# Patient Record
Sex: FEMALE | Race: WHITE | NOT HISPANIC OR LATINO | Employment: OTHER | URBAN - METROPOLITAN AREA
[De-identification: names, ages, dates, MRNs, and addresses within clinical notes are randomized per-mention and may not be internally consistent; named-entity substitution may affect disease eponyms.]

---

## 2024-01-23 ENCOUNTER — HOSPITAL ENCOUNTER (INPATIENT)
Facility: HOSPITAL | Age: 64
LOS: 3 days | Discharge: HOME | End: 2024-01-26
Attending: EMERGENCY MEDICINE | Admitting: INTERNAL MEDICINE
Payer: COMMERCIAL

## 2024-01-23 ENCOUNTER — APPOINTMENT (OUTPATIENT)
Dept: CARDIOLOGY | Facility: HOSPITAL | Age: 64
End: 2024-01-23
Payer: COMMERCIAL

## 2024-01-23 ENCOUNTER — APPOINTMENT (OUTPATIENT)
Dept: RADIOLOGY | Facility: HOSPITAL | Age: 64
End: 2024-01-23
Payer: COMMERCIAL

## 2024-01-23 DIAGNOSIS — R65.21 SEPTIC SHOCK (MULTI): Primary | ICD-10-CM

## 2024-01-23 DIAGNOSIS — A41.9 SEPTIC SHOCK (MULTI): Primary | ICD-10-CM

## 2024-01-23 PROBLEM — G93.41 ACUTE METABOLIC ENCEPHALOPATHY: Status: ACTIVE | Noted: 2024-01-23

## 2024-01-23 PROBLEM — C34.80 MALIGNANT NEOPLASM OF OVERLAPPING SITES OF LUNG (MULTI): Status: ACTIVE | Noted: 2024-01-23

## 2024-01-23 PROBLEM — D84.9 IMMUNOCOMPROMISED STATE (MULTI): Status: ACTIVE | Noted: 2024-01-23

## 2024-01-23 LAB
ALBUMIN SERPL BCP-MCNC: 3.6 G/DL (ref 3.4–5)
ALP SERPL-CCNC: 62 U/L (ref 33–136)
ALT SERPL W P-5'-P-CCNC: 12 U/L (ref 7–45)
ANION GAP BLDV CALCULATED.4IONS-SCNC: 13 MMOL/L (ref 10–25)
ANION GAP SERPL CALC-SCNC: 17 MMOL/L (ref 10–20)
APPEARANCE UR: ABNORMAL
AST SERPL W P-5'-P-CCNC: 21 U/L (ref 9–39)
BASE EXCESS BLDV CALC-SCNC: 2.6 MMOL/L (ref -2–3)
BASOPHILS # BLD MANUAL: 0.03 X10*3/UL (ref 0–0.1)
BASOPHILS NFR BLD MANUAL: 2 %
BILIRUB SERPL-MCNC: 0.6 MG/DL (ref 0–1.2)
BILIRUB UR STRIP.AUTO-MCNC: NEGATIVE MG/DL
BODY TEMPERATURE: 37 DEGREES CELSIUS
BUN SERPL-MCNC: 16 MG/DL (ref 6–23)
CA-I BLDV-SCNC: 1.09 MMOL/L (ref 1.1–1.33)
CALCIUM SERPL-MCNC: 8.9 MG/DL (ref 8.6–10.3)
CHLORIDE BLDV-SCNC: 100 MMOL/L (ref 98–107)
CHLORIDE SERPL-SCNC: 98 MMOL/L (ref 98–107)
CO2 SERPL-SCNC: 25 MMOL/L (ref 21–32)
COLOR UR: YELLOW
CREAT SERPL-MCNC: 0.96 MG/DL (ref 0.5–1.05)
EGFRCR SERPLBLD CKD-EPI 2021: 67 ML/MIN/1.73M*2
EOSINOPHIL # BLD MANUAL: 0 X10*3/UL (ref 0–0.7)
EOSINOPHIL NFR BLD MANUAL: 0 %
ERYTHROCYTE [DISTWIDTH] IN BLOOD BY AUTOMATED COUNT: 18.6 % (ref 11.5–14.5)
FLUAV RNA RESP QL NAA+PROBE: NOT DETECTED
FLUBV RNA RESP QL NAA+PROBE: NOT DETECTED
GLUCOSE BLDV-MCNC: 121 MG/DL (ref 74–99)
GLUCOSE SERPL-MCNC: 119 MG/DL (ref 74–99)
GLUCOSE UR STRIP.AUTO-MCNC: NEGATIVE MG/DL
HCO3 BLDV-SCNC: 26.1 MMOL/L (ref 22–26)
HCT VFR BLD AUTO: 27.9 % (ref 36–46)
HCT VFR BLD EST: 29 % (ref 36–46)
HGB BLD-MCNC: 9.1 G/DL (ref 12–16)
HGB BLDV-MCNC: 9.6 G/DL (ref 12–16)
IMM GRANULOCYTES # BLD AUTO: 0.03 X10*3/UL (ref 0–0.7)
IMM GRANULOCYTES NFR BLD AUTO: 1.8 % (ref 0–0.9)
INHALED O2 CONCENTRATION: 21 %
KETONES UR STRIP.AUTO-MCNC: NEGATIVE MG/DL
LACTATE BLDV-SCNC: 2.5 MMOL/L (ref 0.4–2)
LACTATE BLDV-SCNC: 3.9 MMOL/L (ref 0.4–2)
LACTATE BLDV-SCNC: 4.4 MMOL/L (ref 0.4–2)
LEUKOCYTE ESTERASE UR QL STRIP.AUTO: NEGATIVE
LYMPHOCYTES # BLD MANUAL: 0.58 X10*3/UL (ref 1.2–4.8)
LYMPHOCYTES NFR BLD MANUAL: 36 %
MCH RBC QN AUTO: 34.2 PG (ref 26–34)
MCHC RBC AUTO-ENTMCNC: 32.6 G/DL (ref 32–36)
MCV RBC AUTO: 105 FL (ref 80–100)
METAMYELOCYTES # BLD MANUAL: 0.03 X10*3/UL
METAMYELOCYTES NFR BLD MANUAL: 2 %
MONOCYTES # BLD MANUAL: 0.54 X10*3/UL (ref 0.1–1)
MONOCYTES NFR BLD MANUAL: 34 %
MUCOUS THREADS #/AREA URNS AUTO: ABNORMAL /LPF
NEUTROPHILS # BLD MANUAL: 0.42 X10*3/UL (ref 1.2–7.7)
NEUTS BAND # BLD MANUAL: 0.26 X10*3/UL (ref 0–0.7)
NEUTS BAND NFR BLD MANUAL: 16 %
NEUTS SEG # BLD MANUAL: 0.16 X10*3/UL (ref 1.2–7)
NEUTS SEG NFR BLD MANUAL: 10 %
NITRITE UR QL STRIP.AUTO: NEGATIVE
NRBC BLD-RTO: 3.7 /100 WBCS (ref 0–0)
OXYHGB MFR BLDV: 67.7 % (ref 45–75)
PCO2 BLDV: 35 MM HG (ref 41–51)
PH BLDV: 7.48 PH (ref 7.33–7.43)
PH UR STRIP.AUTO: 6 [PH]
PLATELET # BLD AUTO: 159 X10*3/UL (ref 150–450)
PO2 BLDV: 41 MM HG (ref 35–45)
POTASSIUM BLDV-SCNC: 2.9 MMOL/L (ref 3.5–5.3)
POTASSIUM SERPL-SCNC: 3 MMOL/L (ref 3.5–5.3)
PROT SERPL-MCNC: 6.5 G/DL (ref 6.4–8.2)
PROT UR STRIP.AUTO-MCNC: NEGATIVE MG/DL
RBC # BLD AUTO: 2.66 X10*6/UL (ref 4–5.2)
RBC # UR STRIP.AUTO: ABNORMAL /UL
RBC #/AREA URNS AUTO: ABNORMAL /HPF
RBC MORPH BLD: ABNORMAL
RSV RNA RESP QL NAA+PROBE: NOT DETECTED
SAO2 % BLDV: 69 % (ref 45–75)
SARS-COV-2 RNA RESP QL NAA+PROBE: NOT DETECTED
SODIUM BLDV-SCNC: 136 MMOL/L (ref 136–145)
SODIUM SERPL-SCNC: 137 MMOL/L (ref 136–145)
SP GR UR STRIP.AUTO: 1.02
SQUAMOUS #/AREA URNS AUTO: ABNORMAL /HPF
TOTAL CELLS COUNTED BLD: 100
UROBILINOGEN UR STRIP.AUTO-MCNC: <2 MG/DL
WBC # BLD AUTO: 1.6 X10*3/UL (ref 4.4–11.3)
WBC #/AREA URNS AUTO: ABNORMAL /HPF

## 2024-01-23 PROCEDURE — 84075 ASSAY ALKALINE PHOSPHATASE: CPT | Performed by: EMERGENCY MEDICINE

## 2024-01-23 PROCEDURE — 99285 EMERGENCY DEPT VISIT HI MDM: CPT | Performed by: EMERGENCY MEDICINE

## 2024-01-23 PROCEDURE — 71045 X-RAY EXAM CHEST 1 VIEW: CPT

## 2024-01-23 PROCEDURE — 96374 THER/PROPH/DIAG INJ IV PUSH: CPT | Mod: 59

## 2024-01-23 PROCEDURE — 96367 TX/PROPH/DG ADDL SEQ IV INF: CPT

## 2024-01-23 PROCEDURE — 87040 BLOOD CULTURE FOR BACTERIA: CPT | Mod: AHULAB | Performed by: EMERGENCY MEDICINE

## 2024-01-23 PROCEDURE — 36415 COLL VENOUS BLD VENIPUNCTURE: CPT | Performed by: EMERGENCY MEDICINE

## 2024-01-23 PROCEDURE — 96366 THER/PROPH/DIAG IV INF ADDON: CPT

## 2024-01-23 PROCEDURE — 2020000001 HC ICU ROOM DAILY

## 2024-01-23 PROCEDURE — 93005 ELECTROCARDIOGRAM TRACING: CPT

## 2024-01-23 PROCEDURE — 96376 TX/PRO/DX INJ SAME DRUG ADON: CPT

## 2024-01-23 PROCEDURE — 96375 TX/PRO/DX INJ NEW DRUG ADDON: CPT

## 2024-01-23 PROCEDURE — 81001 URINALYSIS AUTO W/SCOPE: CPT | Performed by: EMERGENCY MEDICINE

## 2024-01-23 PROCEDURE — 96361 HYDRATE IV INFUSION ADD-ON: CPT

## 2024-01-23 PROCEDURE — 85027 COMPLETE CBC AUTOMATED: CPT | Performed by: EMERGENCY MEDICINE

## 2024-01-23 PROCEDURE — 87637 SARSCOV2&INF A&B&RSV AMP PRB: CPT | Performed by: EMERGENCY MEDICINE

## 2024-01-23 PROCEDURE — 84132 ASSAY OF SERUM POTASSIUM: CPT | Performed by: EMERGENCY MEDICINE

## 2024-01-23 PROCEDURE — 71045 X-RAY EXAM CHEST 1 VIEW: CPT | Performed by: RADIOLOGY

## 2024-01-23 PROCEDURE — 85007 BL SMEAR W/DIFF WBC COUNT: CPT | Performed by: EMERGENCY MEDICINE

## 2024-01-23 PROCEDURE — A4217 STERILE WATER/SALINE, 500 ML: HCPCS | Performed by: EMERGENCY MEDICINE

## 2024-01-23 PROCEDURE — 96365 THER/PROPH/DIAG IV INF INIT: CPT

## 2024-01-23 PROCEDURE — 99291 CRITICAL CARE FIRST HOUR: CPT | Performed by: INTERNAL MEDICINE

## 2024-01-23 PROCEDURE — 2500000004 HC RX 250 GENERAL PHARMACY W/ HCPCS (ALT 636 FOR OP/ED): Performed by: EMERGENCY MEDICINE

## 2024-01-23 PROCEDURE — 83605 ASSAY OF LACTIC ACID: CPT | Performed by: EMERGENCY MEDICINE

## 2024-01-23 PROCEDURE — 71250 CT THORAX DX C-: CPT | Performed by: RADIOLOGY

## 2024-01-23 PROCEDURE — 2500000001 HC RX 250 WO HCPCS SELF ADMINISTERED DRUGS (ALT 637 FOR MEDICARE OP): Performed by: EMERGENCY MEDICINE

## 2024-01-23 PROCEDURE — 99291 CRITICAL CARE FIRST HOUR: CPT | Performed by: EMERGENCY MEDICINE

## 2024-01-23 PROCEDURE — 2500000005 HC RX 250 GENERAL PHARMACY W/O HCPCS

## 2024-01-23 PROCEDURE — 74176 CT ABD & PELVIS W/O CONTRAST: CPT | Performed by: RADIOLOGY

## 2024-01-23 PROCEDURE — 74176 CT ABD & PELVIS W/O CONTRAST: CPT

## 2024-01-23 RX ORDER — GABAPENTIN 100 MG/1
100 CAPSULE ORAL EVERY 8 HOURS SCHEDULED
Status: DISCONTINUED | OUTPATIENT
Start: 2024-01-23 | End: 2024-01-24

## 2024-01-23 RX ORDER — ACETAMINOPHEN 325 MG/1
975 TABLET ORAL ONCE
Status: DISCONTINUED | OUTPATIENT
Start: 2024-01-23 | End: 2024-01-25

## 2024-01-23 RX ORDER — ENOXAPARIN SODIUM 100 MG/ML
40 INJECTION SUBCUTANEOUS EVERY 24 HOURS
Status: DISCONTINUED | OUTPATIENT
Start: 2024-01-24 | End: 2024-01-26 | Stop reason: HOSPADM

## 2024-01-23 RX ORDER — LEVETIRACETAM 500 MG/1
500 TABLET ORAL 2 TIMES DAILY
Status: DISCONTINUED | OUTPATIENT
Start: 2024-01-23 | End: 2024-01-24

## 2024-01-23 RX ORDER — ACETAMINOPHEN 650 MG/1
650 SUPPOSITORY RECTAL ONCE
Status: COMPLETED | OUTPATIENT
Start: 2024-01-23 | End: 2024-01-23

## 2024-01-23 RX ORDER — NOREPINEPHRINE BITARTRATE/D5W 8 MG/250ML
.01-1 PLASTIC BAG, INJECTION (ML) INTRAVENOUS CONTINUOUS
Status: DISCONTINUED | OUTPATIENT
Start: 2024-01-23 | End: 2024-01-24

## 2024-01-23 RX ORDER — LORAZEPAM 2 MG/ML
1 INJECTION INTRAMUSCULAR NIGHTLY
Status: DISCONTINUED | OUTPATIENT
Start: 2024-01-23 | End: 2024-01-24

## 2024-01-23 RX ORDER — NOREPINEPHRINE BITARTRATE/D5W 8 MG/250ML
PLASTIC BAG, INJECTION (ML) INTRAVENOUS
Status: COMPLETED
Start: 2024-01-23 | End: 2024-01-23

## 2024-01-23 RX ORDER — POTASSIUM CHLORIDE 14.9 MG/ML
20 INJECTION INTRAVENOUS
Status: COMPLETED | OUTPATIENT
Start: 2024-01-23 | End: 2024-01-24

## 2024-01-23 RX ADMIN — PIPERACILLIN SODIUM AND TAZOBACTAM SODIUM 4.5 G: 4; .5 INJECTION, SOLUTION INTRAVENOUS at 19:12

## 2024-01-23 RX ADMIN — ACETAMINOPHEN 650 MG: 650 SUPPOSITORY RECTAL at 20:08

## 2024-01-23 RX ADMIN — SODIUM CHLORIDE 1000 ML: 9 INJECTION, SOLUTION INTRAVENOUS at 22:25

## 2024-01-23 RX ADMIN — NOREPINEPHRINE BITARTRATE 0.05 MCG/KG/MIN: 8 INJECTION, SOLUTION INTRAVENOUS at 23:01

## 2024-01-23 RX ADMIN — SODIUM CHLORIDE 2448 ML: 9 INJECTION, SOLUTION INTRAVENOUS at 19:12

## 2024-01-23 RX ADMIN — POTASSIUM CHLORIDE 20 MEQ: 14.9 INJECTION, SOLUTION INTRAVENOUS at 22:30

## 2024-01-23 RX ADMIN — POTASSIUM CHLORIDE 20 MEQ: 14.9 INJECTION, SOLUTION INTRAVENOUS at 20:06

## 2024-01-23 RX ADMIN — VANCOMYCIN HYDROCHLORIDE 2 G: 10 INJECTION, POWDER, LYOPHILIZED, FOR SOLUTION INTRAVENOUS at 20:06

## 2024-01-23 ASSESSMENT — PAIN - FUNCTIONAL ASSESSMENT: PAIN_FUNCTIONAL_ASSESSMENT: 0-10

## 2024-01-23 ASSESSMENT — PAIN SCALES - GENERAL: PAINLEVEL_OUTOF10: 0 - NO PAIN

## 2024-01-23 ASSESSMENT — COLUMBIA-SUICIDE SEVERITY RATING SCALE - C-SSRS
2. HAVE YOU ACTUALLY HAD ANY THOUGHTS OF KILLING YOURSELF?: NO
1. IN THE PAST MONTH, HAVE YOU WISHED YOU WERE DEAD OR WISHED YOU COULD GO TO SLEEP AND NOT WAKE UP?: NO
6. HAVE YOU EVER DONE ANYTHING, STARTED TO DO ANYTHING, OR PREPARED TO DO ANYTHING TO END YOUR LIFE?: NO

## 2024-01-23 NOTE — ED PROVIDER NOTES
HPI   Chief Complaint   Patient presents with    Lethargy         63-year-old female, [place], dating family.  Underlying lung cancer, stage IV metastatic, on treatment in [place].  Presents now with altered mental status, weakness confusion nausea vomiting noted today when son got home from work.  Patient really has no particular additional historical details.  Confused.    Temperature noted over 102.                          No data recorded                Patient History   Past Medical History:   Diagnosis Date    Cancer (CMS/HCC)     Personal history of other endocrine, nutritional and metabolic disease 10/30/2014    History of hypothyroidism    Personal history of pre-term labor 10/30/2014    History of premature delivery     Past Surgical History:   Procedure Laterality Date    ORIF HIP FRACTURE Left      No family history on file.  Social History     Tobacco Use    Smoking status: Former     Types: Cigarettes    Smokeless tobacco: Not on file   Substance Use Topics    Alcohol use: Not Currently    Drug use: Not Currently       Review of Systems   Unable to perform ROS: Acuity of condition        Physical Exam   ED Triage Vitals   Temp Pulse Resp BP   -- -- -- --      SpO2 Temp src Heart Rate Source Patient Position   -- -- -- --      BP Location FiO2 (%)     -- --       Physical Exam  Vitals reviewed.   Constitutional:       General: She is not in acute distress.     Appearance: She is well-developed.   HENT:      Head: Normocephalic and atraumatic.      Right Ear: External ear normal.      Left Ear: External ear normal.      Nose: Nose normal.      Mouth/Throat:      Mouth: Mucous membranes are moist.      Pharynx: Oropharynx is clear.   Eyes:      Conjunctiva/sclera: Conjunctivae normal.      Pupils: Pupils are equal, round, and reactive to light.   Neck:      Vascular: No JVD.   Cardiovascular:      Rate and Rhythm: Regular rhythm. Tachycardia present.      Pulses: Normal pulses.   Pulmonary:       Effort: Pulmonary effort is normal. No accessory muscle usage or respiratory distress.      Comments: Occasional scattered anterior lateral rhonchi but no definitive consolidation.  Abdominal:      General: Abdomen is flat. There is no distension.      Palpations: Abdomen is soft. There is no mass.      Tenderness: There is no abdominal tenderness.   Musculoskeletal:         General: Normal range of motion.      Cervical back: Normal range of motion and neck supple.   Lymphadenopathy:      Cervical: No cervical adenopathy.   Skin:     General: Skin is warm and dry.      Capillary Refill: Capillary refill takes less than 2 seconds.      Comments: No zoster rash seen    No overt other rashes.   Neurological:      General: No focal deficit present.      Mental Status: She is alert. Mental status is at baseline.   Psychiatric:         Mood and Affect: Mood normal.                 ECG if performed, ordered and interpreted by ED physician:        Labs Reviewed   CBC WITH AUTO DIFFERENTIAL - Abnormal       Result Value    WBC 1.6 (*)     nRBC 3.7 (*)     RBC 2.66 (*)     Hemoglobin 9.1 (*)     Hematocrit 27.9 (*)      (*)     MCH 34.2 (*)     MCHC 32.6      RDW 18.6 (*)     Platelets 159      Immature Granulocytes %, Automated 1.8 (*)     Immature Granulocytes Absolute, Automated 0.03      Narrative:     The previously reported component Neutrophils % is no longer being reported.  The previously reported component Lymphocytes % is no longer being reported.  The previously reported component Monocytes % is no longer being reported.  The previously   reported component Eosinophils % is no longer being reported.  The previously reported component Basophils % is no longer being reported.  The previously reported component Absolute Neutrophils is no longer being reported.  The previously reported   component Absolute Lymphocytes is no longer being reported.  The previously reported component Absolute Monocytes is no  longer being reported.  The previously reported component Absolute Eosinophils is no longer being reported.  The previously reported   component Absolute Basophils is no longer being reported.   COMPREHENSIVE METABOLIC PANEL - Abnormal    Glucose 119 (*)     Sodium 137      Potassium 3.0 (*)     Chloride 98      Bicarbonate 25      Anion Gap 17      Urea Nitrogen 16      Creatinine 0.96      eGFR 67      Calcium 8.9      Albumin 3.6      Alkaline Phosphatase 62      Total Protein 6.5      AST 21      Bilirubin, Total 0.6      ALT 12     BLOOD GAS VENOUS FULL PANEL - Abnormal    POCT pH, Venous 7.48 (*)     POCT pCO2, Venous 35 (*)     POCT pO2, Venous 41      POCT SO2, Venous 69      POCT Oxy Hemoglobin, Venous 67.7      POCT Hematocrit Calculated, Venous 29.0 (*)     POCT Sodium, Venous 136      POCT Potassium, Venous 2.9 (*)     POCT Chloride, Venous 100      POCT Ionized Calicum, Venous 1.09 (*)     POCT Glucose, Venous 121 (*)     POCT Lactate, Venous 4.4 (*)     POCT Base Excess, Venous 2.6      POCT HCO3 Calculated, Venous 26.1 (*)     POCT Hemoglobin, Venous 9.6 (*)     POCT Anion Gap, Venous 13.0      Patient Temperature 37.0      FiO2 21     URINALYSIS WITH REFLEX CULTURE AND MICROSCOPIC - Abnormal    Color, Urine Yellow      Appearance, Urine Hazy (*)     Specific Gravity, Urine 1.016      pH, Urine 6.0      Protein, Urine NEGATIVE      Glucose, Urine NEGATIVE      Blood, Urine MODERATE (2+) (*)     Ketones, Urine NEGATIVE      Bilirubin, Urine NEGATIVE      Urobilinogen, Urine <2.0      Nitrite, Urine NEGATIVE      Leukocyte Esterase, Urine NEGATIVE     BLOOD GAS LACTIC ACID, VENOUS - Abnormal    POCT Lactate, Venous 3.9 (*)    BLOOD GAS LACTIC ACID, VENOUS - Abnormal    POCT Lactate, Venous 2.5 (*)    CBC - Abnormal    WBC 3.9 (*)     nRBC 0.5 (*)     RBC 2.25 (*)     Hemoglobin 7.5 (*)     Hematocrit 24.1 (*)      (*)     MCH 33.3      MCHC 31.1 (*)     RDW 19.3 (*)     Platelets 138 (*)    RENAL  FUNCTION PANEL - Abnormal    Glucose 90      Sodium 142      Potassium 3.4 (*)     Chloride 108 (*)     Bicarbonate 23      Anion Gap 14      Urea Nitrogen 13      Creatinine 0.76      eGFR 89      Calcium 7.8 (*)     Phosphorus 3.5      Albumin 3.0 (*)    CBC - Abnormal    WBC 7.3      nRBC 0.4 (*)     RBC 2.70 (*)     Hemoglobin 9.2 (*)     Hematocrit 30.1 (*)      (*)     MCH 34.1 (*)     MCHC 30.6 (*)     RDW 18.8 (*)     Platelets 158     RENAL FUNCTION PANEL - Abnormal    Glucose 91      Sodium 140      Potassium 3.4 (*)     Chloride 106      Bicarbonate 19 (*)     Anion Gap 18      Urea Nitrogen 9      Creatinine 0.77      eGFR 87      Calcium 9.0      Phosphorus 2.3 (*)     Albumin 3.5     RENAL FUNCTION PANEL - Abnormal    Glucose 108 (*)     Sodium 139      Potassium 3.1 (*)     Chloride 104      Bicarbonate 24      Anion Gap 14      Urea Nitrogen 10      Creatinine 0.79      eGFR 85      Calcium 9.1      Phosphorus 2.1 (*)     Albumin 3.6     VANCOMYCIN, TROUGH - Abnormal    Vancomycin, Trough 26.6 (*)    CBC WITH AUTO DIFFERENTIAL - Abnormal    WBC 6.8      nRBC 0.7 (*)     RBC 2.90 (*)     Hemoglobin 9.9 (*)     Hematocrit 31.2 (*)      (*)     MCH 34.1 (*)     MCHC 31.7 (*)     RDW 18.4 (*)     Platelets 214      Neutrophils % 69.8      Immature Granulocytes %, Automated 0.7      Lymphocytes % 19.8      Monocytes % 9.0      Eosinophils % 0.4      Basophils % 0.3      Neutrophils Absolute 4.73      Immature Granulocytes Absolute, Automated 0.05      Lymphocytes Absolute 1.34      Monocytes Absolute 0.61      Eosinophils Absolute 0.03      Basophils Absolute 0.02     POCT GLUCOSE - Abnormal    POCT Glucose 111 (*)    URINALYSIS MICROSCOPIC WITH REFLEX CULTURE - Abnormal    WBC, Urine 1-5      RBC, Urine 6-10 (*)     Squamous Epithelial Cells, Urine 1-9 (SPARSE)      Mucus, Urine 1+     MANUAL DIFFERENTIAL - Abnormal    Neutrophils %, Manual 10.0      Bands %, Manual 16.0      Lymphocytes %,  Manual 36.0      Monocytes %, Manual 34.0      Eosinophils %, Manual 0.0      Basophils %, Manual 2.0      Metamyelocytes %, Manual 2.0      Seg Neutrophils Absolute, Manual 0.16 (*)     Bands Absolute, Manual 0.26      Lymphocytes Absolute, Manual 0.58 (*)     Monocytes Absolute, Manual 0.54      Eosinophils Absolute, Manual 0.00      Basophils Absolute, Manual 0.03      Metamyelocytes Absolute, Manual 0.03      Total Cells Counted 100      Neutrophils Absolute, Manual 0.42 (*)     RBC Morphology No significant RBC morphology present     BLOOD CULTURE - Normal    Blood Culture No growth at 4 days -  FINAL REPORT     BLOOD CULTURE - Normal    Blood Culture No growth at 4 days -  FINAL REPORT     SARS-COV-2 PCR, SYMPTOMATIC - Normal    Coronavirus 2019, PCR Not Detected      Narrative:     This assay has received FDA Emergency Use Authorization (EUA) and is only authorized for the duration of time that circumstances exist to justify the authorization of the emergency use of in vitro diagnostic tests for the detection of SARS-CoV-2 virus and/or diagnosis of COVID-19 infection under section 564(b)(1) of the Act, 21 U.S.C. 360bbb-3(b)(1). This assay is an in vitro diagnostic nucleic acid amplification test for the qualitative detection of SARS-CoV-2 from nasopharyngeal specimens and has been validated for use at [facility]. Negative results do not preclude COVID-19 infections and should not be used as the sole basis for diagnosis, treatment, or other management decisions.     INFLUENZA A AND B PCR - Normal    Flu A Result Not Detected      Flu B Result Not Detected      Narrative:     This assay is an in vitro diagnostic multiplex nucleic acid amplification test for the detection and discrimination of Influenza A & B from nasopharyngeal specimens, and has been validated for use at [facility]. Negative results do not preclude Influenza A/B infections, and should not be used  as the sole basis for diagnosis, treatment, or other management decisions. If Influenza A/B and RSV PCR results are negative, testing for Parainfluenza virus, Adenovirus and Metapneumovirus is routinely performed for [facility] pediatric oncology and intensive care inpatients, and is available on other patients by placing an add-on request.   RSV PCR - Normal    RSV PCR Not Detected      Narrative:     This assay is an FDA-cleared, in vitro diagnostic nucleic acid amplification test for the detection of RSV from nasopharyngeal specimens, and has been validated for use at [facility]. Negative results do not preclude RSV infections, and should not be used as the sole basis for diagnosis, treatment, or other management decisions. If Influenza A/B and RSV PCR results are negative, testing for Parainfluenza virus, Adenovirus and Metapneumovirus is routinely performed for pediatric oncology and intensive care inpatients at [facility], and is available on other patients by placing an add-on request.       VANCOMYCIN - Normal    Vancomycin 14.4      Narrative:     Vancomycin levels can be monitored according to area under the curve (AUC) or concentration (ug/mL). The preferred monitoring strategy is determined by the patient's renal function and indication for therapy.     For AUC monitoring, a random vancomycin level should be interpreted in the context of AUC rather than the concentration at a single point in time.    For concentration monitoring, a trough concentration drawn immediately prior to the next dose is preferred.       Therapeutic ranges using concentration-guided results:  Peak (all ages):                  30.0-40.0 ug/mL  Trough (all ages):                10.0-20.0 ug/mL              VITAMIN B12 - Normal    Vitamin B12 422     FOLATE - Normal    Folate, Serum 18.7      Narrative:     Low           <3.4  Borderline 3.4-5.0  Normal        >5.0    Patients receiving more than 5 mg/day of biotin may  have interference in test results. A sample should be taken no sooner than eight hours after previous dose. Contact the testing laboratory for additional information.    MAGNESIUM - Normal    Magnesium 1.90     POCT GLUCOSE - Normal    POCT Glucose 96     URINALYSIS WITH REFLEX CULTURE AND MICROSCOPIC    Narrative:     The following orders were created for panel order Urinalysis with Reflex Culture and Microscopic.  Procedure                               Abnormality         Status                     ---------                               -----------         ------                     Urinalysis with Reflex C...[[id number]]  Abnormal            Final result               Extra Urine Gray Tube[[id number]]                                                         Please view results for these tests on the individual orders.   LACTATE   POCT GLUCOSE METER   POCT GLUCOSE METER   POCT GLUCOSE METER   POCT GLUCOSE METER          CT head wo IV contrast   Final Result   No evidence of acute cortical infarct or intracranial hemorrhage.        No definite intracranial metastasis detected on noncontrast CT if   persistent concern for intracranial metastasis, contrast-enhanced MRI   is advised.        Pansinusitis.        MACRO:   None        Signed by: Shimon Kebede 1/24/2024 1:14 AM   Dictation workstation:   [id number]      CT chest abdomen pelvis wo IV contrast   Final Result   Pulmonary emphysematous and 3.4 cm x 1.9 cm masslike opacity in the   medial right upper lobe highly concerning for malignancy; clinical   correlation is recommended and comparison with available outside   prior imaging and short-term progress imaging for further evaluation.        Small ground-glass nodular opacities in the right middle lobe and   also minimal nodular opacities in the right lower lobe which may be   infectious/inflammatory in etiology, however malignancy not excluded   given the described right upper lung finding and attention on    short-term progress imaging is recommended to assure resolution and   exclude metastatic disease.        No evidence of acute abnormality of the abdominal viscera within the   limitations of noncontrast examination.        MACRO:   None        Signed by: Raul Bronson 1/23/2024 9:55 PM   Dictation workstation:   [id number]      XR chest 1 view   Final Result   Subtle increased bibasilar interstitial opacities may relate to   atelectasis versus developing airspace disease. Correlate clinically   and attention on continued short-term follow-up is advised.        MACRO:   None        Signed by: Candi Ortiz 1/23/2024 7:33 PM   Dictation workstation:   [id number]               ED Course & MDM     ED Medication Administration from 01/23/2024 1824 to 01/24/2024 0033         Date/Time Order Dose Route Action Action by     01/23/2024 1855 EST acetaminophen (Tylenol) tablet 975 mg 975 mg oral Not Given Nikole, J     01/23/2024 1912 EST piperacillin-tazobactam-dextrose (Zosyn) IV 4.5 g 4.5 g intravenous New Bag [place], B     01/23/2024 1912 EST sodium chloride 0.9 % bolus 2,448 mL 2,448 mL intravenous New Bag [place], B     01/23/2024 1942 EST piperacillin-tazobactam-dextrose (Zosyn) IV 4.5 g 0 g intravenous Stopped [facility], J     01/23/2024 2006 EST potassium chloride 20 mEq in 100 mL IV premix 20 mEq intravenous New Bag Bular, J     01/23/2024 2006 EST vancomycin (Vancocin) 2000 mg/500 ml in D5W 500 mL IV piggyback 2 g 2 g intravenous Given Nikole, J     01/23/2024 2008 EST acetaminophen (Tylenol) suppository 650 mg 650 mg rectal Given Bular, J     01/23/2024 2112 EST sodium chloride 0.9 % bolus 2,448 mL 0 mL intravenous Stopped [place]ular, J     01/23/2024 2139 EST potassium chloride 20 mEq in 100 mL IV premix 0 mEq intravenous Stopped Bular, J     01/23/2024 2140 EST potassium chloride 20 mEq in 100 mL IV premix 20 mEq intravenous Not Given Bular, J     01/23/2024 2225 EST sodium chloride 0.9 % bolus 1,000 mL 1,000 mL  intravenous New Bag Nikole, WM     01/23/2024 2230 EST potassium chloride 20 mEq in 100 mL IV premix 20 mEq intravenous New Bag DARRYL Crisosotmo     01/23/2024 2301 EST norepinephrine (Levophed) 8 mg in dextrose 5% 250 mL (0.032 mg/mL) infusion (premix) 0.05 mcg/kg/min intravenous New Bag Nikole, J     01/23/2024 2310 EST norepinephrine (Levophed) 8 mg in dextrose 5% 250 mL (0.032 mg/mL) infusion (premix) 0.02 mcg/kg/min intravenous Rate/Dose Change Nikole, WM     01/23/2024 2325 EST sodium chloride 0.9 % bolus 1,000 mL 0 mL intravenous Stopped [facility]     01/23/2024 2345 EST gabapentin (Neurontin) capsule 100 mg 100 mg oral Not Given [facility]     01/23/2024 2345 EST levETIRAcetam (Keppra) tablet 500 mg 500 mg oral Not Given [facility]     01/23/2024 2345 EST LORazepam (Ativan) injection 1 mg 1 mg intravenous Not Given [facility]     01/24/2024 0000 EST norepinephrine (Levophed) 8 mg in dextrose 5% 250 mL (0.032 mg/mL) infusion (premix) 0.01 mcg/kg/min intravenous Rate/Dose Verify [facility]     01/24/2024 0005 EST LORazepam (Ativan) tablet 1 mg 1 mg oral Not Given [facility]     01/24/2024 0014 EST norepinephrine (Levophed) 8 mg in dextrose 5% 250 mL (0.032 mg/mL) infusion (premix) 0.04 mcg/kg/min intravenous New Bag Nikole, WM     01/24/2024 0030 EST potassium chloride 20 mEq in 100 mL IV premix 0 mEq intravenous Stopped [facility]                   ED Course as of 02/11/24 1439   Tue Jan 23, 2024   1901 ECG 12 Lead  EKG ordered and interpreted by ED physician demonstrates sinus tachycardia, 114 bpm.  Nonspecific ST wave abnormalities.  No acute ischemic findings.  No old to compare to. [KS]      ED Course User Index  [KS] Hakeem Finney MD MPH         Diagnoses as of 02/11/24 1439   Septic shock (CMS/HCC)       Medical Decision Making  Patient with concern for sepsis given high fever, tachycardia, low pulse oximetry.  Concern for pneumonia, influenza COVID RSV.  Confusion likely due to high fever and early sepsis.  Labs  ordered.  Fluid resuscitation initiated.  Antipyretics.  Antibiotics initiated.      Concern for sepsis.  Blood pressure drifting downward despite fluids though MAP greater than 60.  Heart rate improving.    Case discussed with ICU.  Agree with ICU placement.    Only foci is potentially pulmonary though likely more malignancy related than true infectious.  Nonetheless viral swabbing negative.  No other foci of infection identified.      Case discussed with intensive care unit who will evaluate patient determine if she needs ICU care or not.              Procedure  Critical Care    Performed by: Hakeem Finney MD MPH  Authorized by: Hakeem Finney MD MPH    Critical care provider statement:     Critical care time (minutes):  45    Critical care time was exclusive of:  Separately billable procedures and treating other patients    Critical care was necessary to treat or prevent imminent or life-threatening deterioration of the following conditions:  Sepsis    Care discussed with: admitting provider             Hakeem Finney MD MPH  01/23/24 4028       Hakeem Finney MD MPH  02/11/24 7950

## 2024-01-23 NOTE — ED TRIAGE NOTES
"PT BROUGHT TO ED BY SON WITH C/O LETHARGY, PT SON STATES \"I CAME HOME FROM WORK AND SHE HAD FECAL MATTER ALL OVER THE HOUSE, SHE HAS NEVER DONE THAT\" PT HAS STAGE 4 LUNG CANCER, LETHARGIC UPON TRIAGE ASSESSMENT UNABLE TO ANSWER QUESTIONS   "

## 2024-01-24 ENCOUNTER — APPOINTMENT (OUTPATIENT)
Dept: CARDIOLOGY | Facility: HOSPITAL | Age: 64
End: 2024-01-24
Payer: COMMERCIAL

## 2024-01-24 ENCOUNTER — APPOINTMENT (OUTPATIENT)
Dept: RADIOLOGY | Facility: HOSPITAL | Age: 64
End: 2024-01-24
Payer: COMMERCIAL

## 2024-01-24 LAB
ALBUMIN SERPL BCP-MCNC: 3 G/DL (ref 3.4–5)
ANION GAP SERPL CALC-SCNC: 14 MMOL/L (ref 10–20)
ATRIAL RATE: 114 BPM
BUN SERPL-MCNC: 13 MG/DL (ref 6–23)
CALCIUM SERPL-MCNC: 7.8 MG/DL (ref 8.6–10.3)
CHLORIDE SERPL-SCNC: 108 MMOL/L (ref 98–107)
CO2 SERPL-SCNC: 23 MMOL/L (ref 21–32)
CREAT SERPL-MCNC: 0.76 MG/DL (ref 0.5–1.05)
EGFRCR SERPLBLD CKD-EPI 2021: 89 ML/MIN/1.73M*2
ERYTHROCYTE [DISTWIDTH] IN BLOOD BY AUTOMATED COUNT: 19.3 % (ref 11.5–14.5)
GLUCOSE BLD MANUAL STRIP-MCNC: 111 MG/DL (ref 74–99)
GLUCOSE BLD MANUAL STRIP-MCNC: 96 MG/DL (ref 74–99)
GLUCOSE SERPL-MCNC: 90 MG/DL (ref 74–99)
HCT VFR BLD AUTO: 24.1 % (ref 36–46)
HGB BLD-MCNC: 7.5 G/DL (ref 12–16)
MCH RBC QN AUTO: 33.3 PG (ref 26–34)
MCHC RBC AUTO-ENTMCNC: 31.1 G/DL (ref 32–36)
MCV RBC AUTO: 107 FL (ref 80–100)
NRBC BLD-RTO: 0.5 /100 WBCS (ref 0–0)
P AXIS: 66 DEGREES
P OFFSET: 214 MS
P ONSET: 155 MS
PHOSPHATE SERPL-MCNC: 3.5 MG/DL (ref 2.5–4.9)
PLATELET # BLD AUTO: 138 X10*3/UL (ref 150–450)
POTASSIUM SERPL-SCNC: 3.4 MMOL/L (ref 3.5–5.3)
PR INTERVAL: 152 MS
Q ONSET: 231 MS
QRS COUNT: 19 BEATS
QRS DURATION: 74 MS
QT INTERVAL: 350 MS
QTC CALCULATION(BAZETT): 482 MS
QTC FREDERICIA: 433 MS
R AXIS: -24 DEGREES
RBC # BLD AUTO: 2.25 X10*6/UL (ref 4–5.2)
SODIUM SERPL-SCNC: 142 MMOL/L (ref 136–145)
T AXIS: 44 DEGREES
T OFFSET: 406 MS
VENTRICULAR RATE: 114 BPM
WBC # BLD AUTO: 3.9 X10*3/UL (ref 4.4–11.3)

## 2024-01-24 PROCEDURE — A4217 STERILE WATER/SALINE, 500 ML: HCPCS | Performed by: PEDIATRICS

## 2024-01-24 PROCEDURE — 80069 RENAL FUNCTION PANEL: CPT | Performed by: NURSE PRACTITIONER

## 2024-01-24 PROCEDURE — 70450 CT HEAD/BRAIN W/O DYE: CPT | Performed by: RADIOLOGY

## 2024-01-24 PROCEDURE — 99232 SBSQ HOSP IP/OBS MODERATE 35: CPT | Performed by: PHYSICIAN ASSISTANT

## 2024-01-24 PROCEDURE — 2500000004 HC RX 250 GENERAL PHARMACY W/ HCPCS (ALT 636 FOR OP/ED): Performed by: INTERNAL MEDICINE

## 2024-01-24 PROCEDURE — 70450 CT HEAD/BRAIN W/O DYE: CPT

## 2024-01-24 PROCEDURE — 2500000001 HC RX 250 WO HCPCS SELF ADMINISTERED DRUGS (ALT 637 FOR MEDICARE OP): Performed by: INTERNAL MEDICINE

## 2024-01-24 PROCEDURE — 2500000004 HC RX 250 GENERAL PHARMACY W/ HCPCS (ALT 636 FOR OP/ED): Performed by: PEDIATRICS

## 2024-01-24 PROCEDURE — 82947 ASSAY GLUCOSE BLOOD QUANT: CPT

## 2024-01-24 PROCEDURE — 2500000004 HC RX 250 GENERAL PHARMACY W/ HCPCS (ALT 636 FOR OP/ED): Performed by: PHYSICIAN ASSISTANT

## 2024-01-24 PROCEDURE — 1200000002 HC GENERAL ROOM WITH TELEMETRY DAILY

## 2024-01-24 PROCEDURE — 2500000004 HC RX 250 GENERAL PHARMACY W/ HCPCS (ALT 636 FOR OP/ED): Performed by: NURSE PRACTITIONER

## 2024-01-24 PROCEDURE — 2500000001 HC RX 250 WO HCPCS SELF ADMINISTERED DRUGS (ALT 637 FOR MEDICARE OP): Performed by: NURSE PRACTITIONER

## 2024-01-24 PROCEDURE — 2500000001 HC RX 250 WO HCPCS SELF ADMINISTERED DRUGS (ALT 637 FOR MEDICARE OP): Performed by: PHYSICIAN ASSISTANT

## 2024-01-24 PROCEDURE — 36415 COLL VENOUS BLD VENIPUNCTURE: CPT | Performed by: NURSE PRACTITIONER

## 2024-01-24 PROCEDURE — 85027 COMPLETE CBC AUTOMATED: CPT | Performed by: NURSE PRACTITIONER

## 2024-01-24 PROCEDURE — 2500000005 HC RX 250 GENERAL PHARMACY W/O HCPCS: Performed by: INTERNAL MEDICINE

## 2024-01-24 PROCEDURE — 93005 ELECTROCARDIOGRAM TRACING: CPT

## 2024-01-24 RX ORDER — DESVENLAFAXINE 100 MG/1
100 TABLET, EXTENDED RELEASE ORAL DAILY
COMMUNITY

## 2024-01-24 RX ORDER — HYDROCODONE BITARTRATE AND ACETAMINOPHEN 5; 325 MG/1; MG/1
1 TABLET ORAL EVERY 6 HOURS PRN
Status: DISCONTINUED | OUTPATIENT
Start: 2024-01-24 | End: 2024-01-24

## 2024-01-24 RX ORDER — HYDROCODONE BITARTRATE AND ACETAMINOPHEN 10; 325 MG/1; MG/1
2 TABLET ORAL EVERY 8 HOURS
COMMUNITY

## 2024-01-24 RX ORDER — BUDESONIDE AND FORMOTEROL FUMARATE DIHYDRATE 80; 4.5 UG/1; UG/1
2 AEROSOL RESPIRATORY (INHALATION)
COMMUNITY

## 2024-01-24 RX ORDER — LORAZEPAM 1 MG/1
1 TABLET ORAL EVERY 12 HOURS
Status: DISCONTINUED | OUTPATIENT
Start: 2024-01-24 | End: 2024-01-25

## 2024-01-24 RX ORDER — HEPARIN 100 UNIT/ML
500 SYRINGE INTRAVENOUS ONCE AS NEEDED
Status: COMPLETED | OUTPATIENT
Start: 2024-01-24 | End: 2024-01-24

## 2024-01-24 RX ORDER — LEVETIRACETAM 500 MG/1
500 TABLET ORAL 2 TIMES DAILY
COMMUNITY

## 2024-01-24 RX ORDER — LEVETIRACETAM 5 MG/ML
500 INJECTION INTRAVASCULAR EVERY 12 HOURS
Status: DISCONTINUED | OUTPATIENT
Start: 2024-01-24 | End: 2024-01-25

## 2024-01-24 RX ORDER — LEVOFLOXACIN 5 MG/ML
750 INJECTION, SOLUTION INTRAVENOUS EVERY 24 HOURS
Status: DISCONTINUED | OUTPATIENT
Start: 2024-01-24 | End: 2024-01-24

## 2024-01-24 RX ORDER — GABAPENTIN 600 MG/1
900 TABLET ORAL 3 TIMES DAILY
COMMUNITY

## 2024-01-24 RX ORDER — LORAZEPAM 1 MG/1
1 TABLET ORAL NIGHTLY
Status: DISCONTINUED | OUTPATIENT
Start: 2024-01-24 | End: 2024-01-24

## 2024-01-24 RX ORDER — LORAZEPAM 1 MG/1
1 TABLET ORAL NIGHTLY PRN
COMMUNITY

## 2024-01-24 RX ORDER — MEROPENEM 1 G/1
1 INJECTION, POWDER, FOR SOLUTION INTRAVENOUS EVERY 8 HOURS
Status: DISCONTINUED | OUTPATIENT
Start: 2024-01-24 | End: 2024-01-26

## 2024-01-24 RX ORDER — GABAPENTIN 400 MG/1
400 CAPSULE ORAL EVERY 8 HOURS SCHEDULED
Status: DISCONTINUED | OUTPATIENT
Start: 2024-01-24 | End: 2024-01-25

## 2024-01-24 RX ORDER — FENTANYL CITRATE 50 UG/ML
25 INJECTION, SOLUTION INTRAMUSCULAR; INTRAVENOUS EVERY 2 HOUR PRN
Status: DISCONTINUED | OUTPATIENT
Start: 2024-01-24 | End: 2024-01-25

## 2024-01-24 RX ORDER — HYDROCODONE BITARTRATE AND ACETAMINOPHEN 5; 325 MG/1; MG/1
2 TABLET ORAL EVERY 6 HOURS PRN
Status: DISCONTINUED | OUTPATIENT
Start: 2024-01-24 | End: 2024-01-26 | Stop reason: HOSPADM

## 2024-01-24 RX ORDER — LEVOTHYROXINE SODIUM 75 UG/1
75 TABLET ORAL
Status: DISCONTINUED | OUTPATIENT
Start: 2024-01-25 | End: 2024-01-26 | Stop reason: HOSPADM

## 2024-01-24 RX ORDER — LEVOTHYROXINE SODIUM 75 UG/1
75 TABLET ORAL
COMMUNITY

## 2024-01-24 RX ORDER — LEVOTHYROXINE SODIUM 75 UG/1
75 TABLET ORAL DAILY
Status: DISCONTINUED | OUTPATIENT
Start: 2024-01-24 | End: 2024-01-24

## 2024-01-24 RX ADMIN — MEROPENEM 1 G: 1 INJECTION, POWDER, FOR SOLUTION INTRAVENOUS at 09:46

## 2024-01-24 RX ADMIN — LEVETIRACETAM 500 MG: 5 INJECTION INTRAVENOUS at 13:04

## 2024-01-24 RX ADMIN — MEROPENEM 2 G: 1 INJECTION, POWDER, FOR SOLUTION INTRAVENOUS at 01:00

## 2024-01-24 RX ADMIN — FENTANYL CITRATE 25 MCG: 50 INJECTION INTRAMUSCULAR; INTRAVENOUS at 23:57

## 2024-01-24 RX ADMIN — HYDROCODONE BITARTRATE AND ACETAMINOPHEN 2 TABLET: 5; 325 TABLET ORAL at 19:25

## 2024-01-24 RX ADMIN — GABAPENTIN 400 MG: 300 CAPSULE ORAL at 13:47

## 2024-01-24 RX ADMIN — HYDROCODONE BITARTRATE AND ACETAMINOPHEN 2 TABLET: 5; 325 TABLET ORAL at 05:35

## 2024-01-24 RX ADMIN — LORAZEPAM 1 MG: 1 TABLET ORAL at 19:25

## 2024-01-24 RX ADMIN — ACYCLOVIR SODIUM 820 MG: 50 INJECTION, SOLUTION INTRAVENOUS at 01:58

## 2024-01-24 RX ADMIN — LEVETIRACETAM 500 MG: 5 INJECTION INTRAVENOUS at 01:59

## 2024-01-24 RX ADMIN — FENTANYL CITRATE 25 MCG: 50 INJECTION INTRAMUSCULAR; INTRAVENOUS at 09:20

## 2024-01-24 RX ADMIN — NOREPINEPHRINE BITARTRATE 0.04 MCG/KG/MIN: 8 INJECTION, SOLUTION INTRAVENOUS at 00:14

## 2024-01-24 RX ADMIN — HEPARIN 500 UNITS: 100 SYRINGE at 16:05

## 2024-01-24 RX ADMIN — VANCOMYCIN HYDROCHLORIDE 1750 MG: 5 INJECTION, POWDER, LYOPHILIZED, FOR SOLUTION INTRAVENOUS at 21:23

## 2024-01-24 RX ADMIN — MEROPENEM 1 G: 1 INJECTION, POWDER, FOR SOLUTION INTRAVENOUS at 16:05

## 2024-01-24 RX ADMIN — GABAPENTIN 100 MG: 100 CAPSULE ORAL at 05:35

## 2024-01-24 RX ADMIN — LEVOFLOXACIN 750 MG: 750 INJECTION, SOLUTION INTRAVENOUS at 01:57

## 2024-01-24 RX ADMIN — HYDROCODONE BITARTRATE AND ACETAMINOPHEN 2 TABLET: 5; 325 TABLET ORAL at 13:04

## 2024-01-24 RX ADMIN — GABAPENTIN 400 MG: 300 CAPSULE ORAL at 21:23

## 2024-01-24 RX ADMIN — ENOXAPARIN SODIUM 40 MG: 40 INJECTION SUBCUTANEOUS at 09:20

## 2024-01-24 RX ADMIN — FENTANYL CITRATE 25 MCG: 50 INJECTION INTRAMUSCULAR; INTRAVENOUS at 16:08

## 2024-01-24 SDOH — HEALTH STABILITY: MENTAL HEALTH: HOW MANY STANDARD DRINKS CONTAINING ALCOHOL DO YOU HAVE ON A TYPICAL DAY?: PATIENT UNABLE TO ANSWER

## 2024-01-24 SDOH — SOCIAL STABILITY: SOCIAL INSECURITY: ARE THERE ANY APPARENT SIGNS OF INJURIES/BEHAVIORS THAT COULD BE RELATED TO ABUSE/NEGLECT?: NO

## 2024-01-24 SDOH — SOCIAL STABILITY: SOCIAL INSECURITY: DOES ANYONE TRY TO KEEP YOU FROM HAVING/CONTACTING OTHER FRIENDS OR DOING THINGS OUTSIDE YOUR HOME?: UNABLE TO ASSESS

## 2024-01-24 SDOH — SOCIAL STABILITY: SOCIAL INSECURITY: HAVE YOU HAD THOUGHTS OF HARMING ANYONE ELSE?: UNABLE TO ASSESS

## 2024-01-24 SDOH — ECONOMIC STABILITY: HOUSING INSECURITY
IN THE LAST 12 MONTHS, WAS THERE A TIME WHEN YOU DID NOT HAVE A STEADY PLACE TO SLEEP OR SLEPT IN A SHELTER (INCLUDING NOW)?: PATIENT UNABLE TO ANSWER

## 2024-01-24 SDOH — ECONOMIC STABILITY: TRANSPORTATION INSECURITY
IN THE PAST 12 MONTHS, HAS THE LACK OF TRANSPORTATION KEPT YOU FROM MEDICAL APPOINTMENTS OR FROM GETTING MEDICATIONS?: PATIENT UNABLE TO ANSWER

## 2024-01-24 SDOH — SOCIAL STABILITY: SOCIAL INSECURITY: ABUSE: ADULT

## 2024-01-24 SDOH — ECONOMIC STABILITY: FOOD INSECURITY
WITHIN THE PAST 12 MONTHS, YOU WORRIED THAT YOUR FOOD WOULD RUN OUT BEFORE YOU GOT MONEY TO BUY MORE.: PATIENT UNABLE TO ANSWER

## 2024-01-24 SDOH — SOCIAL STABILITY: SOCIAL INSECURITY
WITHIN THE LAST YEAR, HAVE YOU BEEN KICKED, HIT, SLAPPED, OR OTHERWISE PHYSICALLY HURT BY YOUR PARTNER OR EX-PARTNER?: PATIENT UNABLE TO ANSWER

## 2024-01-24 SDOH — HEALTH STABILITY: PHYSICAL HEALTH: ON AVERAGE, HOW MANY MINUTES DO YOU ENGAGE IN EXERCISE AT THIS LEVEL?: PATIENT UNABLE TO ANSWER

## 2024-01-24 SDOH — SOCIAL STABILITY: SOCIAL INSECURITY: HAS ANYONE EVER THREATENED TO HURT YOUR FAMILY OR YOUR PETS?: UNABLE TO ASSESS

## 2024-01-24 SDOH — ECONOMIC STABILITY: TRANSPORTATION INSECURITY
IN THE PAST 12 MONTHS, HAS LACK OF TRANSPORTATION KEPT YOU FROM MEETINGS, WORK, OR FROM GETTING THINGS NEEDED FOR DAILY LIVING?: PATIENT UNABLE TO ANSWER

## 2024-01-24 SDOH — SOCIAL STABILITY: SOCIAL INSECURITY: WERE YOU ABLE TO COMPLETE ALL THE BEHAVIORAL HEALTH SCREENINGS?: YES

## 2024-01-24 SDOH — SOCIAL STABILITY: SOCIAL INSECURITY: ARE THERE ANY APPARENT SIGNS OF INJURIES/BEHAVIORS THAT COULD BE RELATED TO ABUSE/NEGLECT?: UNABLE TO ASSESS

## 2024-01-24 SDOH — SOCIAL STABILITY: SOCIAL INSECURITY: ARE YOU OR HAVE YOU BEEN THREATENED OR ABUSED PHYSICALLY, EMOTIONALLY, OR SEXUALLY BY ANYONE?: NO

## 2024-01-24 SDOH — SOCIAL STABILITY: SOCIAL NETWORK: HOW OFTEN DO YOU ATTENT MEETINGS OF THE CLUB OR ORGANIZATION YOU BELONG TO?: PATIENT UNABLE TO ANSWER

## 2024-01-24 SDOH — ECONOMIC STABILITY: FOOD INSECURITY
WITHIN THE PAST 12 MONTHS, THE FOOD YOU BOUGHT JUST DIDN'T LAST AND YOU DIDN'T HAVE MONEY TO GET MORE.: PATIENT UNABLE TO ANSWER

## 2024-01-24 SDOH — SOCIAL STABILITY: SOCIAL INSECURITY: ARE YOU OR HAVE YOU BEEN THREATENED OR ABUSED PHYSICALLY, EMOTIONALLY, OR SEXUALLY BY ANYONE?: UNABLE TO ASSESS

## 2024-01-24 SDOH — SOCIAL STABILITY: SOCIAL INSECURITY
WITHIN THE LAST YEAR, HAVE TO BEEN RAPED OR FORCED TO HAVE ANY KIND OF SEXUAL ACTIVITY BY YOUR PARTNER OR EX-PARTNER?: PATIENT UNABLE TO ANSWER

## 2024-01-24 SDOH — SOCIAL STABILITY: SOCIAL INSECURITY: DO YOU FEEL ANYONE HAS EXPLOITED OR TAKEN ADVANTAGE OF YOU FINANCIALLY OR OF YOUR PERSONAL PROPERTY?: UNABLE TO ASSESS

## 2024-01-24 SDOH — SOCIAL STABILITY: SOCIAL INSECURITY: DOES ANYONE TRY TO KEEP YOU FROM HAVING/CONTACTING OTHER FRIENDS OR DOING THINGS OUTSIDE YOUR HOME?: NO

## 2024-01-24 SDOH — SOCIAL STABILITY: SOCIAL NETWORK: ARE YOU MARRIED, WIDOWED, DIVORCED, SEPARATED, NEVER MARRIED, OR LIVING WITH A PARTNER?: PATIENT UNABLE TO ANSWER

## 2024-01-24 SDOH — HEALTH STABILITY: MENTAL HEALTH: HOW OFTEN DO YOU HAVE 6 OR MORE DRINKS ON ONE OCCASION?: PATIENT UNABLE TO ANSWER

## 2024-01-24 SDOH — SOCIAL STABILITY: SOCIAL NETWORK
DO YOU BELONG TO ANY CLUBS OR ORGANIZATIONS SUCH AS CHURCH GROUPS UNIONS, FRATERNAL OR ATHLETIC GROUPS, OR SCHOOL GROUPS?: PATIENT UNABLE TO ANSWER

## 2024-01-24 SDOH — SOCIAL STABILITY: SOCIAL INSECURITY: DO YOU FEEL UNSAFE GOING BACK TO THE PLACE WHERE YOU ARE LIVING?: NO

## 2024-01-24 SDOH — ECONOMIC STABILITY: INCOME INSECURITY
IN THE PAST 12 MONTHS, HAS THE ELECTRIC, GAS, OIL, OR WATER COMPANY THREATENED TO SHUT OFF SERVICE IN YOUR HOME?: PATIENT UNABLE TO ANSWER

## 2024-01-24 SDOH — ECONOMIC STABILITY: INCOME INSECURITY
IN THE LAST 12 MONTHS, WAS THERE A TIME WHEN YOU WERE NOT ABLE TO PAY THE MORTGAGE OR RENT ON TIME?: PATIENT UNABLE TO ANSWER

## 2024-01-24 SDOH — HEALTH STABILITY: MENTAL HEALTH: HOW OFTEN DO YOU HAVE A DRINK CONTAINING ALCOHOL?: PATIENT UNABLE TO ANSWER

## 2024-01-24 SDOH — ECONOMIC STABILITY: INCOME INSECURITY
HOW HARD IS IT FOR YOU TO PAY FOR THE VERY BASICS LIKE FOOD, HOUSING, MEDICAL CARE, AND HEATING?: PATIENT UNABLE TO ANSWER

## 2024-01-24 SDOH — SOCIAL STABILITY: SOCIAL INSECURITY: HAS ANYONE EVER THREATENED TO HURT YOUR FAMILY OR YOUR PETS?: NO

## 2024-01-24 SDOH — SOCIAL STABILITY: SOCIAL INSECURITY: DO YOU FEEL UNSAFE GOING BACK TO THE PLACE WHERE YOU ARE LIVING?: UNABLE TO ASSESS

## 2024-01-24 SDOH — SOCIAL STABILITY: SOCIAL INSECURITY
WITHIN THE LAST YEAR, HAVE YOU BEEN HUMILIATED OR EMOTIONALLY ABUSED IN OTHER WAYS BY YOUR PARTNER OR EX-PARTNER?: PATIENT UNABLE TO ANSWER

## 2024-01-24 SDOH — HEALTH STABILITY: PHYSICAL HEALTH
ON AVERAGE, HOW MANY DAYS PER WEEK DO YOU ENGAGE IN MODERATE TO STRENUOUS EXERCISE (LIKE A BRISK WALK)?: PATIENT UNABLE TO ANSWER

## 2024-01-24 SDOH — SOCIAL STABILITY: SOCIAL NETWORK: HOW OFTEN DO YOU ATTEND CHURCH OR RELIGIOUS SERVICES?: PATIENT UNABLE TO ANSWER

## 2024-01-24 SDOH — SOCIAL STABILITY: SOCIAL INSECURITY: DO YOU FEEL ANYONE HAS EXPLOITED OR TAKEN ADVANTAGE OF YOU FINANCIALLY OR OF YOUR PERSONAL PROPERTY?: NO

## 2024-01-24 SDOH — SOCIAL STABILITY: SOCIAL NETWORK: HOW OFTEN DO YOU GET TOGETHER WITH FRIENDS OR RELATIVES?: PATIENT UNABLE TO ANSWER

## 2024-01-24 SDOH — SOCIAL STABILITY: SOCIAL INSECURITY: WITHIN THE LAST YEAR, HAVE YOU BEEN AFRAID OF YOUR PARTNER OR EX-PARTNER?: PATIENT UNABLE TO ANSWER

## 2024-01-24 SDOH — HEALTH STABILITY: MENTAL HEALTH
STRESS IS WHEN SOMEONE FEELS TENSE, NERVOUS, ANXIOUS, OR CAN'T SLEEP AT NIGHT BECAUSE THEIR MIND IS TROUBLED. HOW STRESSED ARE YOU?: PATIENT UNABLE TO ANSWER

## 2024-01-24 SDOH — SOCIAL STABILITY: SOCIAL INSECURITY: HAVE YOU HAD THOUGHTS OF HARMING ANYONE ELSE?: NO

## 2024-01-24 SDOH — SOCIAL STABILITY: SOCIAL NETWORK: IN A TYPICAL WEEK, HOW MANY TIMES DO YOU TALK ON THE PHONE WITH FAMILY, FRIENDS, OR NEIGHBORS?: PATIENT UNABLE TO ANSWER

## 2024-01-24 ASSESSMENT — LIFESTYLE VARIABLES
HOW OFTEN DO YOU HAVE A DRINK CONTAINING ALCOHOL: PATIENT UNABLE TO ANSWER
SKIP TO QUESTIONS 9-10: 0
HOW MANY STANDARD DRINKS CONTAINING ALCOHOL DO YOU HAVE ON A TYPICAL DAY: PATIENT DOES NOT DRINK
SKIP TO QUESTIONS 9-10: 0
AUDIT-C TOTAL SCORE: -1
HOW MANY STANDARD DRINKS CONTAINING ALCOHOL DO YOU HAVE ON A TYPICAL DAY: PATIENT UNABLE TO ANSWER
AUDIT-C TOTAL SCORE: 0
SKIP TO QUESTIONS 9-10: 0
HOW OFTEN DO YOU HAVE 6 OR MORE DRINKS ON ONE OCCASION: PATIENT UNABLE TO ANSWER
SKIP TO QUESTIONS 9-10: 1
AUDIT-C TOTAL SCORE: -1
HOW OFTEN DO YOU HAVE A DRINK CONTAINING ALCOHOL: NEVER
AUDIT-C TOTAL SCORE: 0
AUDIT-C TOTAL SCORE: -1
AUDIT-C TOTAL SCORE: -1
HOW OFTEN DO YOU HAVE 6 OR MORE DRINKS ON ONE OCCASION: NEVER

## 2024-01-24 ASSESSMENT — ENCOUNTER SYMPTOMS
DIFFICULTY URINATING: 0
ACTIVITY CHANGE: 1
SHORTNESS OF BREATH: 0
EYE DISCHARGE: 0
CONFUSION: 1
COLOR CHANGE: 0
FLANK PAIN: 0
DYSURIA: 0
ABDOMINAL PAIN: 0
MYALGIAS: 1
ARTHRALGIAS: 0
PALPITATIONS: 0
EYE ITCHING: 0
CHILLS: 1
COUGH: 0
CHEST TIGHTNESS: 0
FATIGUE: 1
POLYPHAGIA: 0
ABDOMINAL DISTENTION: 0
SEIZURES: 0
FEVER: 1

## 2024-01-24 ASSESSMENT — ACTIVITIES OF DAILY LIVING (ADL)
PATIENT'S MEMORY ADEQUATE TO SAFELY COMPLETE DAILY ACTIVITIES?: UNABLE TO ASSESS
GROOMING: UNABLE TO ASSESS
DRESSING YOURSELF: UNABLE TO ASSESS
FEEDING YOURSELF: UNABLE TO ASSESS
BATHING: UNABLE TO ASSESS
HEARING - RIGHT EAR: UNABLE TO ASSESS
TOILETING: UNABLE TO ASSESS
ADEQUATE_TO_COMPLETE_ADL: UNABLE TO ASSESS
WALKS IN HOME: UNABLE TO ASSESS
JUDGMENT_ADEQUATE_SAFELY_COMPLETE_DAILY_ACTIVITIES: UNABLE TO ASSESS
LACK_OF_TRANSPORTATION: NO
HEARING - LEFT EAR: UNABLE TO ASSESS

## 2024-01-24 ASSESSMENT — PAIN DESCRIPTION - ORIENTATION
ORIENTATION: LOWER

## 2024-01-24 ASSESSMENT — COGNITIVE AND FUNCTIONAL STATUS - GENERAL
DRESSING REGULAR UPPER BODY CLOTHING: A LITTLE
PATIENT BASELINE BEDBOUND: NO
CLIMB 3 TO 5 STEPS WITH RAILING: A LITTLE
DAILY ACTIVITIY SCORE: 24
PERSONAL GROOMING: A LITTLE
DRESSING REGULAR LOWER BODY CLOTHING: A LITTLE
EATING MEALS: A LITTLE
HELP NEEDED FOR BATHING: A LITTLE
MOBILITY SCORE: 18
WALKING IN HOSPITAL ROOM: A LITTLE
MOBILITY SCORE: 24
STANDING UP FROM CHAIR USING ARMS: A LITTLE
MOVING TO AND FROM BED TO CHAIR: A LITTLE
DAILY ACTIVITIY SCORE: 18
TOILETING: A LITTLE
MOVING FROM LYING ON BACK TO SITTING ON SIDE OF FLAT BED WITH BEDRAILS: A LITTLE
TURNING FROM BACK TO SIDE WHILE IN FLAT BAD: A LITTLE

## 2024-01-24 ASSESSMENT — PAIN - FUNCTIONAL ASSESSMENT
PAIN_FUNCTIONAL_ASSESSMENT: 0-10

## 2024-01-24 ASSESSMENT — PAIN SCALES - GENERAL
PAINLEVEL_OUTOF10: 7
PAINLEVEL_OUTOF10: 0 - NO PAIN
PAINLEVEL_OUTOF10: 6
PAINLEVEL_OUTOF10: 9
PAINLEVEL_OUTOF10: 8
PAINLEVEL_OUTOF10: 0 - NO PAIN
PAINLEVEL_OUTOF10: 5 - MODERATE PAIN
PAINLEVEL_OUTOF10: 5 - MODERATE PAIN
PAINLEVEL_OUTOF10: 7
PAINLEVEL_OUTOF10: 0 - NO PAIN

## 2024-01-24 ASSESSMENT — PAIN DESCRIPTION - DESCRIPTORS
DESCRIPTORS: ACHING
DESCRIPTORS: THROBBING
DESCRIPTORS: ACHING

## 2024-01-24 ASSESSMENT — PATIENT HEALTH QUESTIONNAIRE - PHQ9
2. FEELING DOWN, DEPRESSED OR HOPELESS: SEVERAL DAYS
1. LITTLE INTEREST OR PLEASURE IN DOING THINGS: NOT AT ALL
SUM OF ALL RESPONSES TO PHQ9 QUESTIONS 1 & 2: 1

## 2024-01-24 ASSESSMENT — PAIN DESCRIPTION - LOCATION
LOCATION: BACK
LOCATION: OTHER (COMMENT)

## 2024-01-24 NOTE — PROGRESS NOTES
Pharmacy Medication History Review    Chitra Guidry is a 63 y.o. female admitted for Septic shock (CMS/[facility]). Pharmacy reviewed the patient's [id number] medications and allergies for accuracy.    The list below reflectives the updated PTA list. Please review each medication in order reconciliation for additional clarification and justification.  Medications Prior to Admission   Medication Sig Dispense Refill Last Dose    budesonide-formoteroL (Symbicort) 80-4.5 mcg/actuation inhaler Inhale 2 puffs 2 times a day. Rinse mouth with water after use to reduce aftertaste and incidence of candidiasis. Do not swallow.   1/23/2024 at pm    daridorexant 50 mg tablet Take 50 mg by mouth once daily at bedtime.   1/22/2024 at pm    desvenlafaxine 100 mg 24 hr tablet Take 1 tablet (100 mg) by mouth once daily. Do not crush, chew, or split.   1/23/2024 at pm    gabapentin (Neurontin) 600 mg tablet Take 1.5 tablets (900 mg) by mouth 3 times a day.   1/23/2024 at pm    HYDROcodone-acetaminophen (Norco)  mg tablet Take 2 tablets by mouth every 8 hours.   Past Week at pm    levETIRAcetam (Keppra) 500 mg tablet Take 1 tablet (500 mg) by mouth 2 times a day.   1/23/2024 at pm    levothyroxine (Synthroid, Levoxyl) 75 mcg tablet Take 1 tablet (75 mcg) by mouth once daily in the morning. Take before meals.   1/23/2024 at pm    LORazepam (Ativan) 1 mg tablet Take 1 tablet (1 mg) by mouth as needed at bedtime for anxiety.   1/23/2024 at pm    naloxone HCl (NALOXONE NASL) Administer 2 sprays into affected nostril(s) if needed.   1/23/2024 at pm        The list below reflectives the updated allergy list. Please review each documented allergy for additional clarification and justification.  Allergies  Reviewed by Ismael Ojeda DO on 1/23/2024        Severity Reactions Comments    Erythromycin Not Specified Unknown     Oxycodone-acetaminophen Not Specified Unknown     Codeine Low Rash             Below are additional concerns with the  patient's PTA list.  Prior to Admission Medications   Prescriptions Last Dose Informant   HYDROcodone-acetaminophen (Norco)  mg tablet Past Week at pm Self   Sig: Take 2 tablets by mouth every 8 hours.   LORazepam (Ativan) 1 mg tablet 1/23/2024 at pm Self   Sig: Take 1 tablet (1 mg) by mouth as needed at bedtime for anxiety.   budesonide-formoteroL (Symbicort) 80-4.5 mcg/actuation inhaler 1/23/2024 at pm Self   Sig: Inhale 2 puffs 2 times a day. Rinse mouth with water after use to reduce aftertaste and incidence of candidiasis. Do not swallow.   daridorexant 50 mg tablet 1/22/2024 at pm Self   Sig: Take 50 mg by mouth once daily at bedtime.   desvenlafaxine 100 mg 24 hr tablet 1/23/2024 at pm Self   Sig: Take 1 tablet (100 mg) by mouth once daily. Do not crush, chew, or split.   gabapentin (Neurontin) 600 mg tablet 1/23/2024 at pm Self   Sig: Take 1.5 tablets (900 mg) by mouth 3 times a day.   levETIRAcetam (Keppra) 500 mg tablet 1/23/2024 at pm Self   Sig: Take 1 tablet (500 mg) by mouth 2 times a day.   levothyroxine (Synthroid, Levoxyl) 75 mcg tablet 1/23/2024 at pm Self   Sig: Take 1 tablet (75 mcg) by mouth once daily in the morning. Take before meals.   naloxone HCl (NALOXONE NASL) 1/23/2024 at pm Self   Sig: Administer 2 sprays into affected nostril(s) if needed.      Facility-Administered Medications: None       Interviewed patient  Stacey Bautista CPhT

## 2024-01-24 NOTE — PROGRESS NOTES
"Vancomycin Dosing by Pharmacy- INITIAL    Chitra Guidry is a 63 y.o. year old female who Pharmacy has been consulted for vancomycin dosing for other sepsis . Based on the patient's indication and renal status this patient will be dosed based on a goal AUC of 500-600.     Renal function is currently stable.    Visit Vitals  BP 83/58   Pulse 81   Temp (!) 39.2 °C (102.6 °F)   Resp 17        Lab Results   Component Value Date    CREATININE 0.96 01/23/2024        Patient weight is No results found for: \"PTWEIGHT\"    No results found for: \"CULTURE\"     No intake/output data recorded.  [unfilled]    Lab Results   Component Value Date    PATIENTTEMP 37.0 01/23/2024          Assessment/Plan     Patient will not be given a loading dose.  Will initiate vancomycin maintenance,  1750 mg every 24 hours.    This dosing regimen is predicted by [employer]ight[employer] to result in the following pharmacokinetic parameters:  Regimen: 1750 mg IV every 24 hours.  Start time: 08:06 on 01/24/2024  Exposure target: AUC24 (range)400-600 mg/L.hr   AUC24,ss: 543 mg/L.hr  Probability of AUC24 > 400: 81 %  Ctrough,ss: 14.9 mg/L  Probability of Ctrough,ss > 20: 27 %  Probability of nephrotoxicity (Lodise MAYELA 2009): 10 %    Follow-up level will be ordered on 1/24 at 0500 unless clinically indicated sooner.  Will continue to monitor renal function daily while on vancomycin and order serum creatinine at least every 48 hours if not already ordered.  Follow for continued vancomycin needs, clinical response, and signs/symptoms of toxicity.       William Vega, PharmD       "

## 2024-01-24 NOTE — PROGRESS NOTES
"Chitra Guidry is a 64 y/o F [place] resident, visiting her son who lives locally, with a known hx of stage IV adenocarcinoma of the lung and ovarian cancer (s/p CLAIR/BSO and omentumectomy 9/22/2023) presently on chemotherapy, hypothyroidism, chronic neuropathic pain, who presented to the ED with Acute encephalopathy x 12 hrs.     Subjective   Overnight  - off of vasopressors and mental status improved mentation       Objective     Physical Exam  Constitutional:       Comments: Quite uncomfortable from right hip/leg pain   Cardiovascular:      Rate and Rhythm: Normal rate and regular rhythm.   Pulmonary:      Effort: Pulmonary effort is normal.      Breath sounds: Normal breath sounds.   Musculoskeletal:         General: Normal range of motion.   Skin:     General: Skin is warm and dry.      Comments: Right chest port site accessed without erythema or drainage   Neurological:      General: No focal deficit present.      Mental Status: She is alert.   Psychiatric:         Mood and Affect: Mood normal.         Last Recorded Vitals  Blood pressure 145/85, pulse 81, temperature 36.9 °C (98.4 °F), temperature source Temporal, resp. rate 13, height 1.62 m (5' 3.78\"), weight 74.7 kg (164 lb 10.9 oz), SpO2 94 %.  Intake/Output last 3 Shifts:  I/O last 3 completed shifts:  In: 421 (5.6 mL/kg) [I.V.:21 (0.3 mL/kg); IV Piggyback:400]  Out: 2050 (27.4 mL/kg) [Urine:2050 (0.8 mL/kg/hr)]  Weight: 74.7 kg     Relevant Results  Scheduled medications  acetaminophen, 975 mg, oral, Once  enoxaparin, 40 mg, subcutaneous, q24h  gabapentin, 400 mg, oral, q8h AARTI  levETIRAcetam, 500 mg, intravenous, q12h  [START ON 1/25/2024] levothyroxine, 75 mcg, oral, Daily before breakfast  LORazepam, 1 mg, oral, q12h  meropenem, 1 g, intravenous, q8h  vancomycin, 1,750 mg, intravenous, q24h      Continuous medications     PRN medications  PRN medications: fentaNYL, heparin flush, HYDROcodone-acetaminophen    LABS:  CMP:  Results from last 7 days   Lab Units " 01/24/24  0545 01/23/24  1859   SODIUM mmol/L 142 137   POTASSIUM mmol/L 3.4* 3.0*   CHLORIDE mmol/L 108* 98   CO2 mmol/L 23 25   ANION GAP mmol/L 14 17   BUN mg/dL 13 16   CREATININE mg/dL 0.76 0.96   EGFR mL/min/1.73m*2 89 67   ALBUMIN g/dL 3.0* 3.6   ALT U/L  --  12   AST U/L  --  21   BILIRUBIN TOTAL mg/dL  --  0.6     CBC:  Results from last 7 days   Lab Units 01/24/24  0545 01/23/24  1859   WBC AUTO x10*3/uL 3.9* 1.6*   HEMOGLOBIN g/dL 7.5* 9.1*   HEMATOCRIT % 24.1* 27.9*   PLATELETS AUTO x10*3/uL 138* 159   MCV fL 107* 105*     COAG:     HEME/ENDO:     CARDIAC:       Electrocardiogram, 12-lead PRN ACS symptoms    Result Date: 1/24/2024  Sinus tachycardia Nonspecific ST abnormality Abnormal ECG No previous ECGs available    CT head wo IV contrast    Result Date: 1/24/2024  Interpreted By:  Shimon Kebede, STUDY: CT HEAD WO IV CONTRAST;  1/24/2024 12:26 am   INDICATION: Signs/Symptoms:hx of metastatic lung cancer with encephalopathy.   COMPARISON: None.   ACCESSION NUMBER(S): [id number]   ORDERING CLINICIAN: ISABEL ROSS   TECHNIQUE: Noncontrast axial CT scan of head was performed. Angled reformats in brain and bone windows were generated. The images were reviewed in bone, brain, blood and soft tissue windows.   FINDINGS: CSF Spaces: The ventricles, sulci and basal cisterns are within normal limits. There is no extraaxial fluid collection.   Parenchyma:  The grey-white differentiation is intact. There is no mass effect or midline shift.  There is no intracranial hemorrhage.   Calvarium: The calvarium is unremarkable.   Paranasal sinuses and mastoids: Diffuse paranasal sinus mucosal thickening compatible with pansinusitis.       No evidence of acute cortical infarct or intracranial hemorrhage.   No definite intracranial metastasis detected on noncontrast CT if persistent concern for intracranial metastasis, contrast-enhanced MRI is advised.   Pansinusitis.   MACRO: None   Signed by: Shimon Kebede 1/24/2024 1:14  AM Dictation workstation:   [id number]    CT chest abdomen pelvis wo IV contrast    Result Date: 1/23/2024  Interpreted By:  Raul Bronson, STUDY: CT CHEST ABDOMEN PELVIS WO CONTRAST;  1/23/2024 9:06 pm   INDICATION: Signs/Symptoms:sob and pain.   COMPARISON: None.   ACCESSION NUMBER(S): [id number]   ORDERING CLINICIAN: LAYA MUSA   TECHNIQUE: Contiguous axial images of the chest, abdomen and pelvis were obtained without intravenous contrast. Coronal and sagittal reformatted images were obtained from the axial images.   FINDINGS: CT CHEST:   The examination is limited secondary to lack of intravenous contrast. A chest port catheter is noted with tip terminating at the level of the cavoatrial junction.   No axillary lymphadenopathy. There is limited evaluation for mediastinal and hilar lymphadenopathy on noncontrast examination. 11 mm anterior paratracheal lymph node. 10 mm and 12 mm subcarinal lymph nodes. Limited left hilar lymphadenopathy.   The heart is normal in size. Coronary artery atherosclerotic calcifications. No significant pericardial effusion.   There is pulmonary emphysema. There is 3.4 cm x 1.9 cm masslike opacity in the medial right upper lung. There are small groundglass nodular opacities in the right middle lobe and also minimal nodular opacities in the right lower lobe. No significant pleural effusion. No pneumothorax.   Multilevel degenerative change of the thoracic spine.   CT ABDOMEN PELVIS:   Evaluation the abdomen pelvis is limited secondary to patient motion, lack of venous contrast, beam hardening artifact secondary to inferior position of the patient's arms. Limited evaluation for liver mass on noncontrast examination. The gallbladder is present. No dilatation of the common bile duct.   The pancreas, spleen, and adrenal glands appear unremarkable.   No evidence of obstructive calculus or significant hydronephrosis. Evaluation of the kidneys is otherwise limited secondary lack of  contrast.   Atherosclerotic calcification of the abdominal aorta and bilateral iliac arteries.   No evidence of bowel obstruction.   Urinary bladder is underdistended and not well evaluated.   No significant free abdominal or pelvic fluid.   Multilevel degenerative change of the lumbar spine. Grade 1 anterolisthesis of L3 on L4 and L5 on S1.       Pulmonary emphysematous and 3.4 cm x 1.9 cm masslike opacity in the medial right upper lobe highly concerning for malignancy; clinical correlation is recommended and comparison with available outside prior imaging and short-term progress imaging for further evaluation.   Small ground-glass nodular opacities in the right middle lobe and also minimal nodular opacities in the right lower lobe which may be infectious/inflammatory in etiology, however malignancy not excluded given the described right upper lung finding and attention on short-term progress imaging is recommended to assure resolution and exclude metastatic disease.   No evidence of acute abnormality of the abdominal viscera within the limitations of noncontrast examination.   MACRO: None   Signed by: Raul Bronson 1/23/2024 9:55 PM Dictation workstation:   [id number]    XR chest 1 view    Result Date: 1/23/2024  Interpreted By:  Candi Ortiz, STUDY: XR CHEST 1 VIEW;  1/23/2024 7:12 pm   INDICATION: Signs/Symptoms:sob/pain.   COMPARISON: None.   ACCESSION NUMBER(S): [id number]   ORDERING CLINICIAN: LAYA MUSA   FINDINGS: Left-sided MediPort terminates in the right atrium.   CARDIOMEDIASTINAL SILHOUETTE: Cardiomediastinal silhouette is normal in size and configuration.   LUNGS: Subtle increased bibasilar interstitial opacities. No consolidation, sizeable effusion or pneumothorax.   ABDOMEN: No remarkable upper abdominal findings.   BONES: Multilevel degenerative changes of the spine.       Subtle increased bibasilar interstitial opacities may relate to atelectasis versus developing airspace disease.  Correlate clinically and attention on continued short-term follow-up is advised.   MACRO: None   Signed by: Candi Ortiz 1/23/2024 7:33 PM Dictation workstation:   [id number]         Assessment/Plan   Chitra Guidry is a 63 y.o. female resident of [place] visiting her son who lives locally, known hx of lung cancer (stage IV adenocarcinoma and Ovarian Cancer currently on carboplatin/Taxol, courtesy of [facility] Oncology Department, per patient's son), presented to the ED with a chief complaint of confusion x 12 hours. Per history obtained from patient's son (Elpidio), patient was found naked, laying in her grandson's bed which was soiled in vomit and stool.  Patient reportedly did not have any recollection of this.  Denies cough, dysuria, abdominal pain.      On arrival to the ED patient was febrile with a Tmax of 102.6 F associated with tachycardia an hypotension. Patient received 30 ml/kg of NS. Lactic acid was noted to be 3.9 mmol/L after fluid bolus administration and non-invasive MAP remained at 60 mm Hg. CT of the Chest abdomen and pelvis was unrevealing for obvious source of infection per my review. Labs were notable for an Leukopenia with an ANC of 420, hypokalemia. Empiric antibiotics V/Z were given. She is admitted to the ICU for further care.     Neuro  Acute metabolic encephalopathy in setting of sepsis unclear etiology  Chronic neuropathic pain  Anxiety  H/o seizures  - Continue keppra  - Home ativan  - increase gabapentin today to 400 TID  - Home Norco  - PRN tylenol  - Add PRN fentanyl    CV  Septic shock- resolved overnight  - ECG and NIBP     Pulm  Oxygenating well on RA  - Goal SPO2 >90% with bronchial hygiene    GI  - Regular diet      Hypokalemia  - I&Os  - RFP daily     Heme/Onc  Anemia likely chronic in nature  Stage IV adenocarcinoma and ovarian cancer  - SCDs and lovenox    Endo  Hypothyroid  - Home synthroid    ID  Septic shock unclear source at this time  BC  pending  Neutropenic fever  - Ihsan and Vanco for now  - DC acyclovir and levoquin  - ID consult    Dispo: transfer out of ICU today      Héctor Frank PA-C

## 2024-01-24 NOTE — PROGRESS NOTES
01/24/24 1248   Discharge Planning   Living Arrangements Spouse/significant other   Support Systems Spouse/significant other;Children   Assistance Needed Independent   Type of Residence Private residence   Number of Stairs to Enter Residence 0   Number of Stairs Within Residence 0   Do you have animals or pets at home? Yes   Type of Animals or Pets 1 dog   Who is requesting discharge planning? Provider   Home or Post Acute Services None   Patient expects to be discharged to: Home   Does the patient need discharge transport arranged? Yes   RoundTrip coordination needed? Yes   Has discharge transport been arranged? No   Financial Resource Strain   How hard is it for you to pay for the very basics like food, housing, medical care, and heating? Not hard   Housing Stability   In the last 12 months, was there a time when you were not able to pay the mortgage or rent on time? N   In the last 12 months, how many places have you lived? 1   In the last 12 months, was there a time when you did not have a steady place to sleep or slept in a shelter (including now)? N   Transportation Needs   In the past 12 months, has lack of transportation kept you from medical appointments or from getting medications? no   In the past 12 months, has lack of transportation kept you from meetings, work, or from getting things needed for daily living? No   Patient Choice   Provider Choice list and CMS website (https://medicare.gov/care-compare#search) for post-acute Quality and Resource Measure Data were provided and reviewed with: Patient;Family   Patient / Family choosing to utilize agency / facility established prior to hospitalization No          Met with patient and her son Elpidio at bedside and explained my role as care coordinator. Patient lives in [place] with her  Tk and was visiting her son. When son came home from work she was lethargic, confused and vomit and feces all over. Patient is alert now and is able to answer  questions. They live in an apartment and she is independent with hercare at home. She uses a cane sometimes. No oxygen in use at home, no HD. Patient will restart chemo next week. Her PCP is from [place] and she has pharmacy in [place],but if she needs anything here she will [facility] in [place]. Patient denies any needs going home.

## 2024-01-24 NOTE — H&P
Impression/Plan: Chitra Guidry is a 64 y/o F [place] resident, visiting her son who lives locally, with a known hx of stage IV adenocarcinoma of the lung and ovarian cancer (s/p CLAIR/BSO and omentumectomy 9/22/2023) presently on chemotherapy, hypothyroidism, chronic neuropathic pain, who presented to the ED with Acute encephalopathy x 12 hrs. On-going issues include the following:     Acute metabolic encephalopathy - secondary to septic shock   Septic shock in the setting of neutropenic fever and immunocompromised state (from chemotherapy). Source of infection is unclear at this time. Possible sources to consider include (lung/pneumonia, access port, CNS)   Lactic Acidosis   Hypokalemia   [facility] counseling       Neuro  - A-F bundle  - [facility] (no hx of seizure[facility]   - [facility] of the head without contrast   - continue home neurontin     CV/ID/Pulm/Renal   - Septic shock bundle   - End-point resuscitation goals: normalization of lactic acid, MAP > 65 mm hg without pressors, urine output > 0.5cc/kg/hr  - source unclear - eval on-going/blood cx's pending   - Empiric abx: V/meropenem/LQ and Acyclovir   - goal SpO2 >/= 92%  - replace K+ along with other electrolytes as indicated   - will place consult to Infectious disease - appreciate input      GI/Nutrition   - reg diet   - ppx: H2B    Endocrine   - goal serum glucose 140-180 mg/dL     Musculoskeletal/skin:   - skin protective measures   - activity: up with assist     Family contact: Elpidio Gallagher (son) [phone]                    History Of Present Illness  Chitra Guidry is a 63 y.o. female resident of [place] visiting her son who lives locally, known hx of lung cancer (stage IV adenocarcinoma and Ovarian Cancer currently on carboplatin/Taxol, courtesy of [facility] Oncology Department, per patient's son), presented to the ED with a chief complaint of confusion x 12 hours. Per history obtained from patient's son (Elpidio), patient was found  naked, laying in her grandson's bed which was soiled in vomit and stool.  Patient reportedly did not have any recollection of this.  Denies cough, dysuria, abdominal pain.     On arrival to the ED patient was febrile with a Tmax of 102.6 F associated with tachycardia an hypotension. Patient received 30 ml/kg of NS. Lactic acid was noted to be 3.9 mmol/L after fluid bolus administration and non-invasive MAP remained at 60 mm Hg. CT of the Chest abdomen and pelvis was unrevealing for obvious source of infection per my review. Labs were notable for an Leukopenia with an ANC of 420, hypokalemia. Empiric antibiotics V/Z were given. She is admitted to the ICU for further care.      Past Medical History  Past Medical History:   Diagnosis Date    Cancer (CMS/[facility])    Stage IV adenocardinoma of lung     Ovarian cancer      Personal history of other endocrine, nutritional and metabolic disease 10/30/2014    History of hypothyroidism    Personal history of pre-term labor 10/30/2014    History of premature delivery       Surgical History  Past Surgical History:   Procedure Laterality Date   1.                2.  ORIF HIP FRACTURE       (Due to lytic bone lesion)         CLAIR/BSO with omentectomy    Left  11/16/2018 9/22/2023        Social History  Prior marijuana use     Family History  Reviewed, non-contributory     Allergies  Erythromycin, Oxycodone-acetaminophen, and Codeine    Review of Systems   Constitutional:  Positive for activity change, chills, fatigue and fever.   HENT:  Negative for congestion and mouth sores.    Eyes:  Negative for discharge and itching.   Respiratory:  Negative for cough, chest tightness and shortness of breath.    Cardiovascular:  Negative for chest pain and palpitations.   Gastrointestinal:  Negative for abdominal distention and abdominal pain.   Endocrine: Negative for polyphagia and polyuria.   Genitourinary:  Negative for difficulty urinating, dysuria and flank pain.  "  Musculoskeletal:  Positive for myalgias. Negative for arthralgias.   Skin:  Negative for color change and rash.   Allergic/Immunologic: Positive for immunocompromised state. Negative for environmental allergies.   Neurological:  Negative for seizures.   Psychiatric/Behavioral:  Positive for confusion. Negative for self-injury.         Physical Exam     Last Recorded Vitals  Blood pressure 107/74, pulse 85, temperature (!) 39.2 °C (102.6 °F), resp. rate 17, height 1.62 m (5' 3.78\"), weight 81.6 kg (180 lb), SpO2 95 %.    Gen: chronically ill-appearing, female, No acute distress  Neuro: GCS E3, V5, M6, no focal CN or motor deficits   CV: S1s2  Pulm: CTAB  Chest: neg accessory muscle recruitment, Port noted on L side of chest (no associated erythema or purulence)   Abd: soft, non-tender   Ext: well perfused, cap refill < 2 sec  Skin: warm and dry, no mottling or rash        Assessment/Plan   Principal Problem:    Septic shock (CMS/HCC)  Active Problems:    Malignant neoplasm of overlapping sites of lung (CMS/HCC)    Acute metabolic encephalopathy    Immunocompromised state (CMS/HCC)      Critical Care time: 121 min              Ismael Ojeda, DO    "

## 2024-01-25 LAB
ALBUMIN SERPL BCP-MCNC: 3.5 G/DL (ref 3.4–5)
ANION GAP SERPL CALC-SCNC: 18 MMOL/L (ref 10–20)
BUN SERPL-MCNC: 9 MG/DL (ref 6–23)
CALCIUM SERPL-MCNC: 9 MG/DL (ref 8.6–10.3)
CHLORIDE SERPL-SCNC: 106 MMOL/L (ref 98–107)
CO2 SERPL-SCNC: 19 MMOL/L (ref 21–32)
CREAT SERPL-MCNC: 0.77 MG/DL (ref 0.5–1.05)
EGFRCR SERPLBLD CKD-EPI 2021: 87 ML/MIN/1.73M*2
ERYTHROCYTE [DISTWIDTH] IN BLOOD BY AUTOMATED COUNT: 18.8 % (ref 11.5–14.5)
FOLATE SERPL-MCNC: 18.7 NG/ML
GLUCOSE SERPL-MCNC: 91 MG/DL (ref 74–99)
HCT VFR BLD AUTO: 30.1 % (ref 36–46)
HGB BLD-MCNC: 9.2 G/DL (ref 12–16)
MAGNESIUM SERPL-MCNC: 1.9 MG/DL (ref 1.6–2.4)
MCH RBC QN AUTO: 34.1 PG (ref 26–34)
MCHC RBC AUTO-ENTMCNC: 30.6 G/DL (ref 32–36)
MCV RBC AUTO: 112 FL (ref 80–100)
NRBC BLD-RTO: 0.4 /100 WBCS (ref 0–0)
PHOSPHATE SERPL-MCNC: 2.3 MG/DL (ref 2.5–4.9)
PLATELET # BLD AUTO: 158 X10*3/UL (ref 150–450)
POTASSIUM SERPL-SCNC: 3.4 MMOL/L (ref 3.5–5.3)
RBC # BLD AUTO: 2.7 X10*6/UL (ref 4–5.2)
SODIUM SERPL-SCNC: 140 MMOL/L (ref 136–145)
VANCOMYCIN SERPL-MCNC: 14.4 UG/ML (ref 5–20)
VIT B12 SERPL-MCNC: 422 PG/ML (ref 211–911)
WBC # BLD AUTO: 7.3 X10*3/UL (ref 4.4–11.3)

## 2024-01-25 PROCEDURE — 2500000004 HC RX 250 GENERAL PHARMACY W/ HCPCS (ALT 636 FOR OP/ED): Performed by: PHYSICIAN ASSISTANT

## 2024-01-25 PROCEDURE — 36415 COLL VENOUS BLD VENIPUNCTURE: CPT | Performed by: PHYSICIAN ASSISTANT

## 2024-01-25 PROCEDURE — 2500000005 HC RX 250 GENERAL PHARMACY W/O HCPCS: Performed by: HOSPITALIST

## 2024-01-25 PROCEDURE — 2500000001 HC RX 250 WO HCPCS SELF ADMINISTERED DRUGS (ALT 637 FOR MEDICARE OP): Performed by: HOSPITALIST

## 2024-01-25 PROCEDURE — 99232 SBSQ HOSP IP/OBS MODERATE 35: CPT | Performed by: HOSPITALIST

## 2024-01-25 PROCEDURE — 85027 COMPLETE CBC AUTOMATED: CPT | Performed by: PHYSICIAN ASSISTANT

## 2024-01-25 PROCEDURE — 82746 ASSAY OF FOLIC ACID SERUM: CPT | Mod: AHULAB | Performed by: HOSPITALIST

## 2024-01-25 PROCEDURE — 2500000004 HC RX 250 GENERAL PHARMACY W/ HCPCS (ALT 636 FOR OP/ED): Performed by: HOSPITALIST

## 2024-01-25 PROCEDURE — 2500000004 HC RX 250 GENERAL PHARMACY W/ HCPCS (ALT 636 FOR OP/ED): Performed by: INTERNAL MEDICINE

## 2024-01-25 PROCEDURE — 2500000001 HC RX 250 WO HCPCS SELF ADMINISTERED DRUGS (ALT 637 FOR MEDICARE OP): Performed by: PHYSICIAN ASSISTANT

## 2024-01-25 PROCEDURE — 94640 AIRWAY INHALATION TREATMENT: CPT

## 2024-01-25 PROCEDURE — 1200000002 HC GENERAL ROOM WITH TELEMETRY DAILY

## 2024-01-25 PROCEDURE — 80069 RENAL FUNCTION PANEL: CPT | Performed by: PHYSICIAN ASSISTANT

## 2024-01-25 PROCEDURE — 83735 ASSAY OF MAGNESIUM: CPT | Performed by: HOSPITALIST

## 2024-01-25 PROCEDURE — 82607 VITAMIN B-12: CPT | Mod: AHULAB | Performed by: HOSPITALIST

## 2024-01-25 PROCEDURE — 2500000002 HC RX 250 W HCPCS SELF ADMINISTERED DRUGS (ALT 637 FOR MEDICARE OP, ALT 636 FOR OP/ED): Performed by: PHYSICIAN ASSISTANT

## 2024-01-25 PROCEDURE — 80202 ASSAY OF VANCOMYCIN: CPT | Performed by: PHYSICIAN ASSISTANT

## 2024-01-25 PROCEDURE — 2500000004 HC RX 250 GENERAL PHARMACY W/ HCPCS (ALT 636 FOR OP/ED): Performed by: NURSE PRACTITIONER

## 2024-01-25 RX ORDER — LEVETIRACETAM 500 MG/1
500 TABLET ORAL 2 TIMES DAILY
Status: DISCONTINUED | OUTPATIENT
Start: 2024-01-25 | End: 2024-01-26 | Stop reason: HOSPADM

## 2024-01-25 RX ORDER — LIDOCAINE 560 MG/1
1 PATCH PERCUTANEOUS; TOPICAL; TRANSDERMAL DAILY
Status: DISCONTINUED | OUTPATIENT
Start: 2024-01-25 | End: 2024-01-26 | Stop reason: HOSPADM

## 2024-01-25 RX ORDER — LORAZEPAM 1 MG/1
1 TABLET ORAL EVERY 12 HOURS PRN
Status: DISCONTINUED | OUTPATIENT
Start: 2024-01-25 | End: 2024-01-26 | Stop reason: HOSPADM

## 2024-01-25 RX ORDER — GABAPENTIN 300 MG/1
900 CAPSULE ORAL EVERY 8 HOURS SCHEDULED
Status: DISCONTINUED | OUTPATIENT
Start: 2024-01-25 | End: 2024-01-26 | Stop reason: HOSPADM

## 2024-01-25 RX ORDER — FORMOTEROL FUMARATE DIHYDRATE 20 UG/2ML
20 SOLUTION RESPIRATORY (INHALATION)
Status: DISCONTINUED | OUTPATIENT
Start: 2024-01-25 | End: 2024-01-26 | Stop reason: HOSPADM

## 2024-01-25 RX ORDER — BUDESONIDE AND FORMOTEROL FUMARATE DIHYDRATE 80; 4.5 UG/1; UG/1
2 AEROSOL RESPIRATORY (INHALATION)
Status: DISCONTINUED | OUTPATIENT
Start: 2024-01-25 | End: 2024-01-25 | Stop reason: CLARIF

## 2024-01-25 RX ORDER — KETOROLAC TROMETHAMINE 30 MG/ML
30 INJECTION, SOLUTION INTRAMUSCULAR; INTRAVENOUS EVERY 8 HOURS PRN
Status: DISCONTINUED | OUTPATIENT
Start: 2024-01-25 | End: 2024-01-26 | Stop reason: HOSPADM

## 2024-01-25 RX ORDER — SODIUM CHLORIDE 0.9 % (FLUSH) 0.9 %
10 SYRINGE (ML) INJECTION EVERY 12 HOURS SCHEDULED
Status: DISCONTINUED | OUTPATIENT
Start: 2024-01-25 | End: 2024-01-26 | Stop reason: HOSPADM

## 2024-01-25 RX ORDER — SODIUM CHLORIDE 0.9 % (FLUSH) 0.9 %
10 SYRINGE (ML) INJECTION AS NEEDED
Status: DISCONTINUED | OUTPATIENT
Start: 2024-01-25 | End: 2024-01-26 | Stop reason: HOSPADM

## 2024-01-25 RX ORDER — BUDESONIDE 0.25 MG/2ML
0.25 INHALANT ORAL
Status: DISCONTINUED | OUTPATIENT
Start: 2024-01-25 | End: 2024-01-26 | Stop reason: HOSPADM

## 2024-01-25 RX ORDER — POTASSIUM CHLORIDE 20 MEQ/1
20 TABLET, EXTENDED RELEASE ORAL ONCE
Status: COMPLETED | OUTPATIENT
Start: 2024-01-25 | End: 2024-01-25

## 2024-01-25 RX ADMIN — Medication 10 ML: at 21:35

## 2024-01-25 RX ADMIN — BUDESONIDE 0.25 MG: 0.25 INHALANT ORAL at 07:14

## 2024-01-25 RX ADMIN — LEVETIRACETAM 500 MG: 500 TABLET, FILM COATED ORAL at 11:14

## 2024-01-25 RX ADMIN — LIDOCAINE 1 PATCH: 4 PATCH TOPICAL at 11:14

## 2024-01-25 RX ADMIN — GABAPENTIN 900 MG: 300 CAPSULE ORAL at 13:19

## 2024-01-25 RX ADMIN — HYDROCODONE BITARTRATE AND ACETAMINOPHEN 2 TABLET: 5; 325 TABLET ORAL at 02:48

## 2024-01-25 RX ADMIN — Medication 1250 MG: at 11:30

## 2024-01-25 RX ADMIN — GABAPENTIN 400 MG: 300 CAPSULE ORAL at 06:27

## 2024-01-25 RX ADMIN — HYDROCODONE BITARTRATE AND ACETAMINOPHEN 2 TABLET: 5; 325 TABLET ORAL at 08:54

## 2024-01-25 RX ADMIN — ENOXAPARIN SODIUM 40 MG: 40 INJECTION SUBCUTANEOUS at 08:55

## 2024-01-25 RX ADMIN — LEVETIRACETAM 500 MG: 5 INJECTION INTRAVENOUS at 00:00

## 2024-01-25 RX ADMIN — POTASSIUM CHLORIDE 20 MEQ: 1500 TABLET, EXTENDED RELEASE ORAL at 08:54

## 2024-01-25 RX ADMIN — LEVOTHYROXINE SODIUM 75 MCG: 75 TABLET ORAL at 06:27

## 2024-01-25 RX ADMIN — Medication 1250 MG: at 23:30

## 2024-01-25 RX ADMIN — LORAZEPAM 1 MG: 1 TABLET ORAL at 08:54

## 2024-01-25 RX ADMIN — Medication 10 ML: at 09:00

## 2024-01-25 RX ADMIN — MEROPENEM 1 G: 1 INJECTION, POWDER, FOR SOLUTION INTRAVENOUS at 11:14

## 2024-01-25 RX ADMIN — KETOROLAC TROMETHAMINE 30 MG: 30 INJECTION, SOLUTION INTRAMUSCULAR; INTRAVENOUS at 05:56

## 2024-01-25 RX ADMIN — MEROPENEM 1 G: 1 INJECTION, POWDER, FOR SOLUTION INTRAVENOUS at 00:23

## 2024-01-25 RX ADMIN — LEVETIRACETAM 500 MG: 500 TABLET, FILM COATED ORAL at 21:35

## 2024-01-25 RX ADMIN — MEROPENEM 1 G: 1 INJECTION, POWDER, FOR SOLUTION INTRAVENOUS at 16:50

## 2024-01-25 RX ADMIN — FORMOTEROL FUMARATE DIHYDRATE 20 MCG: 20 SOLUTION RESPIRATORY (INHALATION) at 07:14

## 2024-01-25 RX ADMIN — HYDROCODONE BITARTRATE AND ACETAMINOPHEN 2 TABLET: 5; 325 TABLET ORAL at 21:45

## 2024-01-25 RX ADMIN — Medication 10 ML: at 08:55

## 2024-01-25 RX ADMIN — GABAPENTIN 900 MG: 300 CAPSULE ORAL at 21:35

## 2024-01-25 ASSESSMENT — COGNITIVE AND FUNCTIONAL STATUS - GENERAL
DAILY ACTIVITIY SCORE: 24
MOBILITY SCORE: 24
DAILY ACTIVITIY SCORE: 24
MOBILITY SCORE: 24

## 2024-01-25 ASSESSMENT — PAIN - FUNCTIONAL ASSESSMENT
PAIN_FUNCTIONAL_ASSESSMENT: 0-10

## 2024-01-25 ASSESSMENT — PAIN SCALES - GENERAL
PAINLEVEL_OUTOF10: 8
PAINLEVEL_OUTOF10: 3
PAINLEVEL_OUTOF10: 6
PAINLEVEL_OUTOF10: 0 - NO PAIN
PAINLEVEL_OUTOF10: 4
PAINLEVEL_OUTOF10: 6
PAINLEVEL_OUTOF10: 8
PAINLEVEL_OUTOF10: 5 - MODERATE PAIN
PAINLEVEL_OUTOF10: 9

## 2024-01-25 ASSESSMENT — PAIN DESCRIPTION - LOCATION
LOCATION: LEG

## 2024-01-25 ASSESSMENT — PAIN DESCRIPTION - ORIENTATION
ORIENTATION: LEFT
ORIENTATION: LEFT

## 2024-01-25 ASSESSMENT — PAIN DESCRIPTION - DESCRIPTORS: DESCRIPTORS: ACHING

## 2024-01-25 NOTE — PROGRESS NOTES
01/25/24 1506   Discharge Planning   Living Arrangements Spouse/significant other  (Patient lives with her  Tk Guidry [phone]. They live in [place]. Patient is her visiting her son Elpidio [phone].)   Support Systems Spouse/significant other;Children   Assistance Needed Independnet in ADLS/ IADLS. Receiving Cancer Treatments at [facility] in [place].  Son denies home oxygen use, no hemodialysis no DME. Patient is driven to medical appointments by her / family. Baseline patient is A & O x4.  Son reports patient is now back to baseline orientation.   Type of Residence Private residence   Number of Stairs to Enter Residence 0   Number of Stairs Within Residence 0   Do you have animals or pets at home? Yes   Type of Animals or Pets 1 dog   Who is requesting discharge planning? Other (Comment)  (Admission RN/ T[facility] Initial Assessment)   Home or Post Acute Services Other (Comment)  (Work up and medical management ongoing. Skilled  needs TBD. None identified at this time. 01/24/2024.)   Patient expects to be discharged to: Home. Patient currently visiting the [facility]. her son lives her. patient is from [place]. Her  is driving from [place] to [facility] 01/24/2024   Does the patient need discharge transport arranged? Yes   RoundTrip coordination needed? Yes     TCC met with  son/ Elpidio and patient ( sleeping. Observed symmetrical rise & fall to chest. Respirations even & unlabored) at bedside. Demographics confirmed.   Araceli CARTER RN TCC CCM

## 2024-01-25 NOTE — PROGRESS NOTES
"Vancomycin Dosing by Pharmacy- FOLLOW UP    Chitra Guidry is a 63 y.o. year old female who Pharmacy has been consulted for vancomycin dosing for other Sepsis . Based on the patient's indication and renal status this patient is being dosed based on a goal AUC of 500-600.     Renal function is currently stable.    Current vancomycin dose: 1750 mg given every 24 hours    Estimated vancomycin AUC on current dose: 417 mg/L.hr     Visit Vitals  /73 (Patient Position: Lying)   Pulse 67   Temp 35.7 °C (96.2 °F)   Resp 18        Lab Results   Component Value Date    CREATININE 0.77 01/25/2024    CREATININE 0.76 01/24/2024    CREATININE 0.96 01/23/2024        Patient weight is No results found for: \"PTWEIGHT\"    No results found for: \"CULTURE\"     I/O last 3 completed shifts:  In: 1361 (18.2 mL/kg) [P.O.:240; I.V.:21 (0.3 mL/kg); IV Piggyback:1100]  Out: 2050 (27.4 mL/kg) [Urine:2050 (0.8 mL/kg/hr)]  Weight: 74.7 kg   [unfilled]    Lab Results   Component Value Date    PATIENTTEMP 37.0 01/23/2024        Assessment/Plan    Below goal AUC. Orders placed for new vancomcyin regimen of 1250 mg every 12 hours to begin at 1130 today.     This dosing regimen is predicted by [employer] to result in the following pharmacokinetic parameters:  Regimen: 1250 mg IV every 12 hours.  Start time: 21:23 on 01/25/2024  Exposure target: AUC24 (range)400-600 mg/L.hr   AUC24,ss: 587 mg/L.hr  Probability of AUC24 > 400: 97 %  Ctrough,ss: 18.6 mg/L  Probability of Ctrough,ss > 20: 42 %  Probability of nephrotoxicity (Lodise MAYELA 2009): 15 %    The next level will be obtained on 1/26/2024 at 0500. May be obtained sooner if clinically indicated.   Will continue to monitor renal function daily while on vancomycin and order serum creatinine at least every 48 hours if not already ordered.  Follow for continued vancomycin needs, clinical response, and signs/symptoms of toxicity.       Jakob Butts, PharmD           "

## 2024-01-25 NOTE — PROGRESS NOTES
[facility] Hospitalist Progress Note        Chitra Guidry    :  1960(63 y.o.)    MRN:  [id number]  Date: 24     Assessment and Plan:     Acute metabolic encephalopathy in setting of sepsis  Septic shock with unclear etiology  Stage IV adenocarcinoma and Ovarian Cancer currently on carboplatin/Taxol at [facility] Oncology Department  Seizure disorder  Macrocytic anemia  Hypothyroidism  Chronic neuropathic pain  Hypokalemia  Hypophosphatemia    Plan  - CT of the Chest abdomen and pelvis was unrevealing for obvious source of infection. UA unrevealing. RSV PCR negative. COVID PCR negative. Influenza PCR negative.   - No focal symptoms reported. ID consulted. Currently on empiric vancomycin and meropenem  - continue home keppra, ativan  - continue home gabapentin, norco  - continue home synthroid    DVT Prophylaxis: subcutaneous Lovenox    Disposition: continue to monitor inpatient, await consultant recommendations, await test results, and await clinical improvement    The patient/family had opportunity to ask questions. All questions were answered to the best of my ability.    Between 7AM-7PM please message me via Epic Secure Chat.  After 7PM please page Nocturnist on call.    Electronically signed by Vance Nam DO on 24 at 7:44 AM     Subjective:      Interval History:   Vitals and chart notes from overnight reviewed.   No acute issues overnight.   Patient seen and evaluated at bedside.     Only complaint is pain in leg (chronic). No cough, shortness of breath. No nausea, vomiting. No diarrhea. No urinary symptoms.    Review of Systems:   Other than patient's chronic conditions and those complaints in the history above, the rest of the 10 systems review were done and were negative.     Current medications:  Scheduled Meds:acetaminophen, 975 mg, oral, Once  budesonide, 0.25 mg, nebulization, Daily  enoxaparin, 40 mg, subcutaneous, q24h  formoterol, 20 mcg, nebulization, BID  gabapentin, 400  mg, oral, q8h AARTI  levETIRAcetam, 500 mg, intravenous, q12h  levothyroxine, 75 mcg, oral, Daily before breakfast  LORazepam, 1 mg, oral, q12h  meropenem, 1 g, intravenous, q8h  vancomycin, 1,750 mg, intravenous, q24h      Continuous Infusions:   PRN Meds:PRN medications: HYDROcodone-acetaminophen, ketorolac      Objective:     Vitals:    01/24/24 1440 01/24/24 2125 01/24/24 2349 01/25/24 0300   BP: 120/72 117/67 145/87 134/83   BP Location:       Patient Position:       Pulse: 78 84 80 81   Resp: 17 14 16 16   Temp:  36.2 °C (97.2 °F) 37 °C (98.6 °F) 36.9 °C (98.4 °F)   TempSrc:  Temporal Temporal Oral   SpO2:  98% 97% 95%   Weight:       Height:            Physical Exam  Vitals and nursing note reviewed.   HENT:      Mouth/Throat:      Mouth: Mucous membranes are moist.      Pharynx: Oropharynx is clear.   Cardiovascular:      Rate and Rhythm: Normal rate and regular rhythm.   Pulmonary:      Effort: Pulmonary effort is normal. No respiratory distress.   Abdominal:      General: There is no distension.      Palpations: Abdomen is soft.      Tenderness: There is no abdominal tenderness.   Skin:     Findings: No bruising or lesion.   Neurological:      General: No focal deficit present.      Mental Status: She is alert and oriented to person, place, and time.         Labs:   Lab Results   Component Value Date     01/24/2024    K 3.4 (L) 01/24/2024     (H) 01/24/2024    CO2 23 01/24/2024    BUN 13 01/24/2024    CREATININE 0.76 01/24/2024    GLUCOSE 90 01/24/2024    CALCIUM 7.8 (L) 01/24/2024    PROT 6.5 01/23/2024    BILITOT 0.6 01/23/2024    ALKPHOS 62 01/23/2024    AST 21 01/23/2024    ALT 12 01/23/2024       Lab Results   Component Value Date    WBC 7.3 01/25/2024    HGB 9.2 (L) 01/25/2024    HCT 30.1 (L) 01/25/2024     (H) 01/25/2024     01/25/2024

## 2024-01-25 NOTE — CARE PLAN
The patient's goals for the shift include na    The clinical goals for the shift include Pt will remain hemodynamically stable throguhout the shift    Over the shift, the patient did not make progress toward the following goals.

## 2024-01-25 NOTE — NURSING NOTE
Pt transfer from icu at this time, vss,  medicated for pain to left hip/leg 8/10, hep loc rt ac patent, placed on telemetry, nsr, neutropenic precautions in place

## 2024-01-26 VITALS
TEMPERATURE: 98.1 F | HEART RATE: 75 BPM | HEIGHT: 64 IN | DIASTOLIC BLOOD PRESSURE: 74 MMHG | SYSTOLIC BLOOD PRESSURE: 126 MMHG | OXYGEN SATURATION: 98 % | RESPIRATION RATE: 20 BRPM | BODY MASS INDEX: 30.15 KG/M2 | WEIGHT: 176.59 LBS

## 2024-01-26 LAB
ALBUMIN SERPL BCP-MCNC: 3.6 G/DL (ref 3.4–5)
ANION GAP SERPL CALC-SCNC: 14 MMOL/L (ref 10–20)
BASOPHILS # BLD AUTO: 0.02 X10*3/UL (ref 0–0.1)
BASOPHILS NFR BLD AUTO: 0.3 %
BUN SERPL-MCNC: 10 MG/DL (ref 6–23)
CALCIUM SERPL-MCNC: 9.1 MG/DL (ref 8.6–10.3)
CHLORIDE SERPL-SCNC: 104 MMOL/L (ref 98–107)
CO2 SERPL-SCNC: 24 MMOL/L (ref 21–32)
CREAT SERPL-MCNC: 0.79 MG/DL (ref 0.5–1.05)
EGFRCR SERPLBLD CKD-EPI 2021: 85 ML/MIN/1.73M*2
EOSINOPHIL # BLD AUTO: 0.03 X10*3/UL (ref 0–0.7)
EOSINOPHIL NFR BLD AUTO: 0.4 %
ERYTHROCYTE [DISTWIDTH] IN BLOOD BY AUTOMATED COUNT: 18.4 % (ref 11.5–14.5)
GLUCOSE SERPL-MCNC: 108 MG/DL (ref 74–99)
HCT VFR BLD AUTO: 31.2 % (ref 36–46)
HGB BLD-MCNC: 9.9 G/DL (ref 12–16)
HOLD SPECIMEN: NORMAL
HOLD SPECIMEN: NORMAL
IMM GRANULOCYTES # BLD AUTO: 0.05 X10*3/UL (ref 0–0.7)
IMM GRANULOCYTES NFR BLD AUTO: 0.7 % (ref 0–0.9)
LYMPHOCYTES # BLD AUTO: 1.34 X10*3/UL (ref 1.2–4.8)
LYMPHOCYTES NFR BLD AUTO: 19.8 %
MCH RBC QN AUTO: 34.1 PG (ref 26–34)
MCHC RBC AUTO-ENTMCNC: 31.7 G/DL (ref 32–36)
MCV RBC AUTO: 108 FL (ref 80–100)
MONOCYTES # BLD AUTO: 0.61 X10*3/UL (ref 0.1–1)
MONOCYTES NFR BLD AUTO: 9 %
NEUTROPHILS # BLD AUTO: 4.73 X10*3/UL (ref 1.2–7.7)
NEUTROPHILS NFR BLD AUTO: 69.8 %
NRBC BLD-RTO: 0.7 /100 WBCS (ref 0–0)
PHOSPHATE SERPL-MCNC: 2.1 MG/DL (ref 2.5–4.9)
PLATELET # BLD AUTO: 214 X10*3/UL (ref 150–450)
POTASSIUM SERPL-SCNC: 3.1 MMOL/L (ref 3.5–5.3)
RBC # BLD AUTO: 2.9 X10*6/UL (ref 4–5.2)
SODIUM SERPL-SCNC: 139 MMOL/L (ref 136–145)
VANCOMYCIN TROUGH SERPL-MCNC: 26.6 UG/ML (ref 5–20)
WBC # BLD AUTO: 6.8 X10*3/UL (ref 4.4–11.3)

## 2024-01-26 PROCEDURE — 2500000004 HC RX 250 GENERAL PHARMACY W/ HCPCS (ALT 636 FOR OP/ED): Performed by: PHYSICIAN ASSISTANT

## 2024-01-26 PROCEDURE — 2500000001 HC RX 250 WO HCPCS SELF ADMINISTERED DRUGS (ALT 637 FOR MEDICARE OP): Performed by: PHYSICIAN ASSISTANT

## 2024-01-26 PROCEDURE — 2500000005 HC RX 250 GENERAL PHARMACY W/O HCPCS: Performed by: HOSPITALIST

## 2024-01-26 PROCEDURE — 2500000004 HC RX 250 GENERAL PHARMACY W/ HCPCS (ALT 636 FOR OP/ED): Performed by: HOSPITALIST

## 2024-01-26 PROCEDURE — 2500000001 HC RX 250 WO HCPCS SELF ADMINISTERED DRUGS (ALT 637 FOR MEDICARE OP): Performed by: INTERNAL MEDICINE

## 2024-01-26 PROCEDURE — 85025 COMPLETE CBC W/AUTO DIFF WBC: CPT | Performed by: INTERNAL MEDICINE

## 2024-01-26 PROCEDURE — 36415 COLL VENOUS BLD VENIPUNCTURE: CPT | Performed by: INTERNAL MEDICINE

## 2024-01-26 PROCEDURE — 99239 HOSP IP/OBS DSCHRG MGMT >30: CPT | Performed by: HOSPITALIST

## 2024-01-26 PROCEDURE — 80069 RENAL FUNCTION PANEL: CPT | Performed by: HOSPITALIST

## 2024-01-26 PROCEDURE — 80202 ASSAY OF VANCOMYCIN: CPT | Performed by: INTERNAL MEDICINE

## 2024-01-26 PROCEDURE — 2500000001 HC RX 250 WO HCPCS SELF ADMINISTERED DRUGS (ALT 637 FOR MEDICARE OP): Performed by: HOSPITALIST

## 2024-01-26 RX ORDER — POTASSIUM CHLORIDE 20 MEQ/1
40 TABLET, EXTENDED RELEASE ORAL ONCE
Status: COMPLETED | OUTPATIENT
Start: 2024-01-26 | End: 2024-01-26

## 2024-01-26 RX ORDER — POTASSIUM CHLORIDE 20 MEQ/1
20 TABLET, EXTENDED RELEASE ORAL DAILY
Qty: 7 TABLET | Refills: 0 | Status: SHIPPED | OUTPATIENT
Start: 2024-01-26 | End: 2024-02-02

## 2024-01-26 RX ORDER — LEVOFLOXACIN 750 MG/1
750 TABLET ORAL DAILY
Qty: 5 TABLET | Refills: 0 | Status: SHIPPED | OUTPATIENT
Start: 2024-01-26 | End: 2024-01-31

## 2024-01-26 RX ORDER — VANCOMYCIN HYDROCHLORIDE 1 G/200ML
1 INJECTION, SOLUTION INTRAVENOUS EVERY 12 HOURS
Status: DISCONTINUED | OUTPATIENT
Start: 2024-01-26 | End: 2024-01-26

## 2024-01-26 RX ORDER — LEVOFLOXACIN 750 MG/1
750 TABLET ORAL
Status: DISCONTINUED | OUTPATIENT
Start: 2024-01-26 | End: 2024-01-26 | Stop reason: HOSPADM

## 2024-01-26 RX ADMIN — Medication 10 ML: at 09:32

## 2024-01-26 RX ADMIN — Medication 250 MG: at 13:11

## 2024-01-26 RX ADMIN — LEVOTHYROXINE SODIUM 75 MCG: 75 TABLET ORAL at 06:37

## 2024-01-26 RX ADMIN — HYDROCODONE BITARTRATE AND ACETAMINOPHEN 2 TABLET: 5; 325 TABLET ORAL at 06:37

## 2024-01-26 RX ADMIN — GABAPENTIN 900 MG: 300 CAPSULE ORAL at 06:37

## 2024-01-26 RX ADMIN — MEROPENEM 1 G: 1 INJECTION, POWDER, FOR SOLUTION INTRAVENOUS at 01:39

## 2024-01-26 RX ADMIN — POTASSIUM CHLORIDE 40 MEQ: 1500 TABLET, EXTENDED RELEASE ORAL at 10:38

## 2024-01-26 RX ADMIN — LIDOCAINE 1 PATCH: 4 PATCH TOPICAL at 09:32

## 2024-01-26 RX ADMIN — LEVOFLOXACIN 750 MG: 750 TABLET, FILM COATED ORAL at 13:10

## 2024-01-26 RX ADMIN — GABAPENTIN 900 MG: 300 CAPSULE ORAL at 13:10

## 2024-01-26 RX ADMIN — LEVETIRACETAM 500 MG: 500 TABLET, FILM COATED ORAL at 09:32

## 2024-01-26 RX ADMIN — MEROPENEM 1 G: 1 INJECTION, POWDER, FOR SOLUTION INTRAVENOUS at 10:38

## 2024-01-26 ASSESSMENT — COGNITIVE AND FUNCTIONAL STATUS - GENERAL
HELP NEEDED FOR BATHING: A LITTLE
DAILY ACTIVITIY SCORE: 18
CLIMB 3 TO 5 STEPS WITH RAILING: A LITTLE
EATING MEALS: A LITTLE
MOVING TO AND FROM BED TO CHAIR: A LITTLE
DRESSING REGULAR LOWER BODY CLOTHING: A LITTLE
PERSONAL GROOMING: A LITTLE
STANDING UP FROM CHAIR USING ARMS: A LITTLE
DRESSING REGULAR UPPER BODY CLOTHING: A LITTLE
WALKING IN HOSPITAL ROOM: A LITTLE
TOILETING: A LITTLE
MOBILITY SCORE: 20

## 2024-01-26 ASSESSMENT — PAIN SCALES - GENERAL
PAINLEVEL_OUTOF10: 9
PAINLEVEL_OUTOF10: 0 - NO PAIN

## 2024-01-26 ASSESSMENT — PAIN DESCRIPTION - LOCATION: LOCATION: BACK

## 2024-01-26 ASSESSMENT — PAIN - FUNCTIONAL ASSESSMENT: PAIN_FUNCTIONAL_ASSESSMENT: 0-10

## 2024-01-26 NOTE — PROGRESS NOTES
Vancomycin Dosing by Pharmacy- Cessation of Therapy    Consult to pharmacy for vancomycin dosing has been discontinued by the prescriber, pharmacy will sign off at this time.    Please call pharmacy if there are further questions or re-enter a consult if vancomycin is resumed.     Romana A Kuchta, PharmD

## 2024-01-26 NOTE — CARE PLAN
Problem: Pain  Goal: My pain/discomfort is manageable  Outcome: Progressing     Problem: Safety  Goal: Patient will be injury free during hospitalization  Outcome: Progressing  Goal: I will remain free of falls  Outcome: Progressing     Problem: Daily Care  Goal: Daily care needs are met  Outcome: Progressing     Problem: Psychosocial Needs  Goal: Demonstrates ability to cope with hospitalization/illness  Outcome: Progressing  Goal: Collaborate with me, my family, and caregiver to identify my specific goals  Outcome: Progressing       The clinical goals for the shift include pt to sit up in chair for 2 hours this shift

## 2024-01-26 NOTE — CONSULTS
INFECTIOUS DISEASE INPATIENT INITIAL CONSULTATION    Referred By: Vance Nam    Reason For Consult: neutropenic fever, septic shock. unclear source     HPI:  This is a 63 y.o. female with PMH of Stage IV lung AdenoCA and ovarian CA who presented with acute encephalopathy.    Is on chemo for above malignancies. Found in bed at home confused, laying in vomit/stool. Febrile on arrival here and hypotensive. Was in septic shock admitted to the ICU. Improved and off vasopressors, sent to [facility] now. Blood cx are NGTD. Has been on IV Vanc/Meropenem. CT chest with maybe signs of developing PNA on 1/23. COVID/Flu/RSV all negative.  initially with WBC of 1.6. No differential since then but I ordered CBC/diff now to check on ANC. Total WBC is better to 7.3 yesterday. ANC is now normal.    She is tired but feeling better in general. Wants to go home.    Allergies:  Erythromycin, Oxycodone-acetaminophen, and Codeine     Vitals (Last 24 Hours):  Heart Rate:  [55-87]   Temp:  [35.7 °C (96.2 °F)-37.1 °C (98.8 °F)]   Resp:  [18]   BP: (119-135)/(70-79)   Weight:  [80.1 kg (176 lb 9.4 oz)]   SpO2:  [92 %-96 %]      PHYSICAL EXAM:  Gen - NAD, laying in bed, seems tired  Heart - RRR, no murmurs  Lungs - clear bilaterally, no wheezing  Abd - soft, no ttp, BS present  Ext - no LE edema  Skin - no rash    MEDS:    Current Facility-Administered Medications:     budesonide (Pulmicort) 0.25 mg/2 mL nebulizer solution 0.25 mg, 0.25 mg, nebulization, Daily, Héctor Frank PA-C, 0.25 mg at 01/25/24 0714    enoxaparin (Lovenox) syringe 40 mg, 40 mg, subcutaneous, q24h, Héctor Frank PA-C, 40 mg at 01/25/24 0855    formoterol (Perforomist) 20 mcg/2 mL nebulizer solution 20 mcg, 20 mcg, nebulization, BID, Héctor Frank PA-C, 20 mcg at 01/25/24 0714    gabapentin (Neurontin) capsule 900 mg, 900 mg, oral, q8h AARTI, Vance Nam, DO, 900 mg at 01/26/24 0637    HYDROcodone-acetaminophen (Norco) 5-325 mg per tablet 2  tablet, 2 tablet, oral, q6h PRN, Héctor Frank PA-C, 2 tablet at 01/26/24 0637    HYDROmorphone PF (Dilaudid) injection 0.2 mg, 0.2 mg, intravenous, q3h PRN, Deep Nam, DO    ketorolac (Toradol) injection 30 mg, 30 mg, intravenous, q8h PRN, Aly G Salomone, DO, 30 mg at 01/25/24 0556    levETIRAcetam (Keppra) tablet 500 mg, 500 mg, oral, BID, Deep Nam, DO, 500 mg at 01/25/24 2135    levothyroxine (Synthroid, Levoxyl) tablet 75 mcg, 75 mcg, oral, Daily before breakfast, Héctor Frank PA-C, 75 mcg at 01/26/24 0637    lidocaine 4 % patch 1 patch, 1 patch, transdermal, Daily, Deep Nam, DO, 1 patch at 01/25/24 1114    LORazepam (Ativan) tablet 1 mg, 1 mg, oral, q12h PRN, Deep Nam, DO    meropenem (Merrem) in sodium chloride 0.9 % 100 mL IV 1 g, 1 g, intravenous, q8h, Héctor Frank PA-C, Stopped at 01/26/24 0209    sodium chloride 0.9% flush 10 mL, 10 mL, intravenous, PRN, Deep Nam, DO, 10 mL at 01/25/24 0855    sodium chloride 0.9% flush 10 mL, 10 mL, intravenous, q12h AARTI, Deep Nam, DO, 10 mL at 01/25/24 2135    vancomycin (Vancocin) in dextrose 5% 250 mL IV 1,250 mg, 1,250 mg, intravenous, q12h, Ismael L Pack, DO, 1,250 mg at 01/25/24 2330     LABS:  Lab Results   Component Value Date    WBC 7.3 01/25/2024    HGB 9.2 (L) 01/25/2024    HCT 30.1 (L) 01/25/2024     (H) 01/25/2024     01/25/2024      Results from last 72 hours   Lab Units 01/25/24  0639   SODIUM mmol/L 140   POTASSIUM mmol/L 3.4*   CHLORIDE mmol/L 106   CO2 mmol/L 19*   BUN mg/dL 9   CREATININE mg/dL 0.77   GLUCOSE mg/dL 91   CALCIUM mg/dL 9.0   ANION GAP mmol/L 18   EGFR mL/min/1.73m*2 87     Results from last 72 hours   Lab Units 01/25/24  0639 01/24/24  0545 01/23/24  1859   ALK PHOS U/L  --   --  62   BILIRUBIN TOTAL mg/dL  --   --  0.6   PROTEIN TOTAL g/dL  --   --  6.5   ALT U/L  --   --  12   AST U/L  --   --  21   ALBUMIN g/dL 3.5   < > 3.6    < > = values in this interval not displayed.     Estimated Creatinine  Clearance: 77.3 mL/min (by C-G formula based on SCr of 0.77 mg/dL).       IMAGING:  CT Head 1/24  Impression:     No evidence of acute cortical infarct or intracranial hemorrhage.      No definite intracranial metastasis detected on noncontrast CT if  persistent concern for intracranial metastasis, contrast-enhanced MRI  is advised.      Pansinusitis.     CT C/A/P 1/23  Impression:     Pulmonary emphysematous and 3.4 cm x 1.9 cm masslike opacity in the  medial right upper lobe highly concerning for malignancy; clinical  correlation is recommended and comparison with available outside  prior imaging and short-term progress imaging for further evaluation.      Small ground-glass nodular opacities in the right middle lobe and  also minimal nodular opacities in the right lower lobe which may be  infectious/inflammatory in etiology, however malignancy not excluded  given the described right upper lung finding and attention on  short-term progress imaging is recommended to assure resolution and  exclude metastatic disease.      No evidence of acute abnormality of the abdominal viscera within the  limitations of noncontrast examination.       ASSESSMENT/PLAN:    Septic Shock - shock resolved  Probable PNA - seems likely cause is PNA as that is all that has been noted infection wise on workup. Infiltrates may have been more subtle due to neutropenia.    ANC is recovered to normal and she is improved. Will change to PO Levofloxacin 750mg Q24H for another 5 days to finish empiric course of abx for PNA.    OK to discharge from ID perspective.    Will sign off. Please call back with questions. Thanks!    Benny Liao MD  [facility]  Office #[phone]

## 2024-01-26 NOTE — CARE PLAN
The patient's goals for the shift include remain safe    The clinical goals for the shift include remain comfortable

## 2024-01-27 NOTE — DISCHARGE SUMMARY
Discharge Diagnosis  Acute metabolic encephalopathy in setting of sepsis  Septic shock due to probable pneumonia  Stage IV adenocarcinoma and Ovarian Cancer currently on carboplatin/Taxol at [facility] Oncology Department  Seizure disorder  Macrocytic anemia  Hypothyroidism  Chronic neuropathic pain  Hypokalemia  Hypophosphatemia    Issues Requiring Follow-Up  - repeat CBC and RFP in 1 week  - follow up with PCP and Oncology at [facility]    Discharge Meds     Your medication list        START taking these medications        Instructions Last Dose Given Next Dose Due   levoFLOXacin 750 mg tablet  Commonly known as: Levaquin      Take 1 tablet (750 mg) by mouth once daily for 5 days.       potassium chloride CR 20 mEq ER tablet  Commonly known as: Klor-Con M20      Take 1 tablet (20 mEq) by mouth once daily for 7 days. Do not crush or chew.              CONTINUE taking these medications        Instructions Last Dose Given Next Dose Due   budesonide-formoteroL 80-4.5 mcg/actuation inhaler  Commonly known as: Symbicort           daridorexant 50 mg tablet           desvenlafaxine 100 mg 24 hr tablet  Commonly known as: Pristiq           gabapentin 600 mg tablet  Commonly known as: Neurontin           HYDROcodone-acetaminophen  mg tablet  Commonly known as: Norco           levETIRAcetam 500 mg tablet  Commonly known as: Keppra           levothyroxine 75 mcg tablet  Commonly known as: Synthroid, Levoxyl           LORazepam 1 mg tablet  Commonly known as: Ativan           NALOXONE NASL                     Where to Get Your Medications        These medications were sent to [facility]/pharmacy #[id number] - [place], [place] - [address]  [address], [place] [address]      Phone: [phone]   levoFLOXacin 750 mg tablet  potassium chloride CR 20 mEq ER tablet         Test Results Pending At Discharge  Pending Labs       Order Current Status    Lactate Collected (01/23/24 3088)    Blood  Culture Preliminary result    Blood Culture Preliminary result            Hospital Course  63 y.o. female with PMH of Stage IV lung AdenoCA and ovarian CA who presented with acute encephalopathy. Is on chemo for above malignancies. Found in bed at home confused, laying in vomit/stool. Febrile on arrival here and hypotensive. Was in septic shock admitted to the ICU. Improved and off vasopressors, sent to [facility] now. Blood cx are NGTD.COVID/Flu/RSV all negative.  initially with WBC of 1.6.  Has been on IV Vanc/Meropenem. ID consulted and per their review CT chest with maybe signs of developing PNA on 1/23. Has been off pressors and clinically stable since transfer out of ICU 48 hours ago. ANC is recovered to normal and she is improved. Will change to PO Levofloxacin 750mg Q24H for another 5 days to finish empiric course of abx for PNA.     Discussed with patient the need to talk to her oncology before she gets any further chemotherapy.    Pertinent Physical Exam At Time of Discharge  Physical Exam  Vitals and nursing note reviewed.   HENT:      Mouth/Throat:      Mouth: Mucous membranes are moist.      Pharynx: Oropharynx is clear.   Cardiovascular:      Rate and Rhythm: Normal rate and regular rhythm.   Pulmonary:      Effort: Pulmonary effort is normal.   Abdominal:      Palpations: Abdomen is soft.   Musculoskeletal:      Right lower leg: No edema.      Left lower leg: No edema.   Neurological:      General: No focal deficit present.      Mental Status: She is alert and oriented to person, place, and time.         Outpatient Follow-Up  No future appointments.      Deep Nam, DO

## 2024-01-28 LAB
BACTERIA BLD CULT: NORMAL
BACTERIA BLD CULT: NORMAL

## 2025-02-10 NOTE — PROGRESS NOTES
Per Dr. Umana can see patient on sat. I tried to call her, had to leave a message. Please try again to reach patient.    Vancomycin Dosing by Pharmacy- FOLLOW UP    Chitra Guidry is a 63 y.o. female for whom Pharmacy has been consulted to dose vancomycin for other (sepsis) . Based on the patient's indication and renal status this patient is being dosed based on a goal AUC of 500-600.     Renal function is currently stable.    Current vancomycin dose: 1250 mg given every 12 hours    Estimated vancomycin AUC on current dose: 703 mg/L.hr     Visit Vitals  /74 (BP Location: Right arm, Patient Position: Lying)   Pulse 62   Temp 36.4 °C (97.5 °F) (Temporal)   Resp 17        Lab Results   Component Value Date    CREATININE 0.79 01/26/2024    CREATININE 0.77 01/25/2024    CREATININE 0.76 01/24/2024    CREATININE 0.96 01/23/2024     I/O last 3 completed shifts:  In: 940 (11.7 mL/kg) [P.O.:240; IV Piggyback:700]  Out: - (0 mL/kg)   Weight: 80.1 kg   [unfilled]    Lab Results   Component Value Date    PATIENTTEMP 37.0 01/23/2024        Assessment/Plan    Above goal AUC. Orders placed for new vancomcyin regimen of 1000 mg every 12 hours to begin at 1130 today.    This dosing regimen is predicted by [email or web address] to result in the following pharmacokinetic parameters:    Regimen: 1000 mg IV every 12 hours.  Start time: 11:30 on 01/26/2024  Exposure target: AUC24 (range)400-600 mg/L.hr   AUC24,ss: 569 mg/L.hr  Probability of AUC24 > 400: 98 %  Ctrough,ss: 18.8 mg/L  Probability of Ctrough,ss > 20: 41 %  Probability of nephrotoxicity (Lodise MAYELA 2009): 15 %    The next level will be obtained on 1/27/24 at 0500. May be obtained sooner if clinically indicated.   Will continue to monitor renal function daily while on vancomycin and order serum creatinine at least every 48 hours if not already ordered.  Follow for continued vancomycin needs, clinical response, and signs/symptoms of toxicity.       Romana A Kuchta, PharmD